# Patient Record
Sex: FEMALE | Race: WHITE | Employment: OTHER | URBAN - METROPOLITAN AREA
[De-identification: names, ages, dates, MRNs, and addresses within clinical notes are randomized per-mention and may not be internally consistent; named-entity substitution may affect disease eponyms.]

---

## 2018-12-22 ENCOUNTER — APPOINTMENT (OUTPATIENT)
Dept: CT IMAGING | Facility: HOSPITAL | Age: 83
End: 2018-12-22
Attending: EMERGENCY MEDICINE
Payer: MEDICARE

## 2018-12-22 ENCOUNTER — HOSPITAL ENCOUNTER (EMERGENCY)
Facility: HOSPITAL | Age: 83
Discharge: HOME | End: 2018-12-22
Attending: EMERGENCY MEDICINE
Payer: MEDICARE

## 2018-12-22 VITALS
DIASTOLIC BLOOD PRESSURE: 76 MMHG | HEART RATE: 91 BPM | TEMPERATURE: 99 F | SYSTOLIC BLOOD PRESSURE: 105 MMHG | OXYGEN SATURATION: 98 % | WEIGHT: 103 LBS | RESPIRATION RATE: 16 BRPM

## 2018-12-22 DIAGNOSIS — S81.811A LEG LACERATION, RIGHT, INITIAL ENCOUNTER: Primary | ICD-10-CM

## 2018-12-22 PROCEDURE — 36415 COLL VENOUS BLD VENIPUNCTURE: CPT

## 2018-12-22 PROCEDURE — 12004 RPR S/N/AX/GEN/TRK7.6-12.5CM: CPT

## 2018-12-22 PROCEDURE — 70450 CT HEAD/BRAIN W/O DYE: CPT | Performed by: EMERGENCY MEDICINE

## 2018-12-22 PROCEDURE — 99284 EMERGENCY DEPT VISIT MOD MDM: CPT

## 2018-12-22 PROCEDURE — 72125 CT NECK SPINE W/O DYE: CPT | Performed by: EMERGENCY MEDICINE

## 2018-12-22 PROCEDURE — 85610 PROTHROMBIN TIME: CPT | Performed by: EMERGENCY MEDICINE

## 2018-12-22 NOTE — ED INITIAL ASSESSMENT (HPI)
Patient trip and fell while getting out of bed. Patient on coumadin. Laceration on right leg. Patient did not hit head or loc.

## 2018-12-22 NOTE — ED PROVIDER NOTES
Patient Seen in: [facility] AND [facility] Emergency Department    History   Patient presents with:  Fall (musculoskeletal, neurologic)    Stated Complaint: fall and laceration     HPI    79 yo female tripped while walking with her cane. She fell.  She sustained There is no rebound and no guarding. Musculoskeletal: Normal range of motion. She exhibits no edema or tenderness. There is no focal bony tenderness. See note for skin laceration   Lymphadenopathy:     She has no cervical adenopathy.    Neurological: Sh

## 2019-08-25 ENCOUNTER — APPOINTMENT (OUTPATIENT)
Dept: GENERAL RADIOLOGY | Facility: HOSPITAL | Age: 84
End: 2019-08-25
Attending: EMERGENCY MEDICINE
Payer: MEDICARE

## 2019-08-25 ENCOUNTER — HOSPITAL ENCOUNTER (EMERGENCY)
Facility: HOSPITAL | Age: 84
Discharge: HOME | End: 2019-08-25
Attending: EMERGENCY MEDICINE
Payer: MEDICARE

## 2019-08-25 VITALS
BODY MASS INDEX: 19.15 KG/M2 | HEART RATE: 99 BPM | WEIGHT: 95 LBS | TEMPERATURE: 98 F | SYSTOLIC BLOOD PRESSURE: 117 MMHG | OXYGEN SATURATION: 100 % | HEIGHT: 59 IN | RESPIRATION RATE: 19 BRPM | DIASTOLIC BLOOD PRESSURE: 69 MMHG

## 2019-08-25 DIAGNOSIS — I50.23 ACUTE ON CHRONIC SYSTOLIC CONGESTIVE HEART FAILURE (HCC): ICD-10-CM

## 2019-08-25 DIAGNOSIS — J44.1 COPD EXACERBATION (HCC): Primary | ICD-10-CM

## 2019-08-25 LAB
ANION GAP SERPL CALC-SCNC: 5 MMOL/L (ref 0–18)
BASOPHILS # BLD AUTO: 0.02 X10(3) UL (ref 0–0.2)
BASOPHILS NFR BLD AUTO: 0.3 %
BILIRUB UR QL: NEGATIVE
BUN BLD-MCNC: 17 MG/DL (ref 7–18)
BUN/CREAT SERPL: 27 (ref 10–20)
CALCIUM BLD-MCNC: 9.1 MG/DL (ref 8.5–10.1)
CHLORIDE SERPL-SCNC: 104 MMOL/L (ref 98–112)
CLARITY UR: CLEAR
CO2 SERPL-SCNC: 35 MMOL/L (ref 21–32)
COLOR UR: YELLOW
CREAT BLD-MCNC: 0.63 MG/DL (ref 0.55–1.02)
DEPRECATED RDW RBC AUTO: 59.9 FL (ref 35.1–46.3)
EOSINOPHIL # BLD AUTO: 0.1 X10(3) UL (ref 0–0.7)
EOSINOPHIL NFR BLD AUTO: 1.7 %
ERYTHROCYTE [DISTWIDTH] IN BLOOD BY AUTOMATED COUNT: 17.1 % (ref 11–15)
GLUCOSE BLD-MCNC: 94 MG/DL (ref 70–99)
GLUCOSE UR-MCNC: NEGATIVE MG/DL
HCT VFR BLD AUTO: 37.4 % (ref 35–48)
HGB BLD-MCNC: 11.5 G/DL (ref 12–16)
HGB UR QL STRIP.AUTO: NEGATIVE
IMM GRANULOCYTES # BLD AUTO: 0.02 X10(3) UL (ref 0–1)
IMM GRANULOCYTES NFR BLD: 0.3 %
KETONES UR-MCNC: NEGATIVE MG/DL
LEUKOCYTE ESTERASE UR QL STRIP.AUTO: NEGATIVE
LYMPHOCYTES # BLD AUTO: 0.74 X10(3) UL (ref 1–4)
LYMPHOCYTES NFR BLD AUTO: 12.6 %
MCH RBC QN AUTO: 29.6 PG (ref 26–34)
MCHC RBC AUTO-ENTMCNC: 30.7 G/DL (ref 31–37)
MCV RBC AUTO: 96.4 FL (ref 80–100)
MONOCYTES # BLD AUTO: 0.78 X10(3) UL (ref 0.1–1)
MONOCYTES NFR BLD AUTO: 13.3 %
NEUTROPHILS # BLD AUTO: 4.19 X10 (3) UL (ref 1.5–7.7)
NEUTROPHILS # BLD AUTO: 4.19 X10(3) UL (ref 1.5–7.7)
NEUTROPHILS NFR BLD AUTO: 71.8 %
NITRITE UR QL STRIP.AUTO: NEGATIVE
NT-PROBNP SERPL-MCNC: 2942 PG/ML (ref ?–450)
OSMOLALITY SERPL CALC.SUM OF ELEC: 299 MOSM/KG (ref 275–295)
PH UR: 5 [PH] (ref 5–8)
PLATELET # BLD AUTO: 247 10(3)UL (ref 150–450)
POTASSIUM SERPL-SCNC: 4.2 MMOL/L (ref 3.5–5.1)
PROT UR-MCNC: 30 MG/DL
RBC # BLD AUTO: 3.88 X10(6)UL (ref 3.8–5.3)
RBC #/AREA URNS AUTO: 2 /HPF
SODIUM SERPL-SCNC: 144 MMOL/L (ref 136–145)
SP GR UR STRIP: 1.02 (ref 1–1.03)
TROPONIN I SERPL-MCNC: <0.045 NG/ML (ref ?–0.04)
UROBILINOGEN UR STRIP-ACNC: 2
VIT C UR-MCNC: NEGATIVE MG/DL
WBC # BLD AUTO: 5.9 X10(3) UL (ref 4–11)
WBC #/AREA URNS AUTO: 1 /HPF

## 2019-08-25 PROCEDURE — 99285 EMERGENCY DEPT VISIT HI MDM: CPT

## 2019-08-25 PROCEDURE — 83880 ASSAY OF NATRIURETIC PEPTIDE: CPT | Performed by: EMERGENCY MEDICINE

## 2019-08-25 PROCEDURE — 85025 COMPLETE CBC W/AUTO DIFF WBC: CPT | Performed by: EMERGENCY MEDICINE

## 2019-08-25 PROCEDURE — 96374 THER/PROPH/DIAG INJ IV PUSH: CPT

## 2019-08-25 PROCEDURE — 80048 BASIC METABOLIC PNL TOTAL CA: CPT | Performed by: EMERGENCY MEDICINE

## 2019-08-25 PROCEDURE — 84484 ASSAY OF TROPONIN QUANT: CPT | Performed by: EMERGENCY MEDICINE

## 2019-08-25 PROCEDURE — 71045 X-RAY EXAM CHEST 1 VIEW: CPT | Performed by: EMERGENCY MEDICINE

## 2019-08-25 PROCEDURE — 93005 ELECTROCARDIOGRAM TRACING: CPT

## 2019-08-25 PROCEDURE — 93010 ELECTROCARDIOGRAM REPORT: CPT | Performed by: EMERGENCY MEDICINE

## 2019-08-25 PROCEDURE — 94640 AIRWAY INHALATION TREATMENT: CPT

## 2019-08-25 PROCEDURE — 81001 URINALYSIS AUTO W/SCOPE: CPT | Performed by: EMERGENCY MEDICINE

## 2019-08-25 RX ORDER — TORSEMIDE 5 MG/1
10 TABLET ORAL 2 TIMES DAILY
COMMUNITY

## 2019-08-25 RX ORDER — TRAMADOL HYDROCHLORIDE 50 MG/1
50 TABLET ORAL 2 TIMES DAILY
Status: ON HOLD | COMMUNITY
End: 2020-11-18 | Stop reason: ALTCHOICE

## 2019-08-25 RX ORDER — METHYLPREDNISOLONE SODIUM SUCCINATE 125 MG/2ML
125 INJECTION, POWDER, LYOPHILIZED, FOR SOLUTION INTRAMUSCULAR; INTRAVENOUS ONCE
Status: COMPLETED | OUTPATIENT
Start: 2019-08-25 | End: 2019-08-25

## 2019-08-25 RX ORDER — LEVALBUTEROL TARTRATE 45 UG/1
1 AEROSOL, METERED ORAL EVERY 4 HOURS PRN
Status: ON HOLD | COMMUNITY
End: 2020-11-18 | Stop reason: ALTCHOICE

## 2019-08-25 RX ORDER — LEVALBUTEROL INHALATION SOLUTION 0.63 MG/3ML
SOLUTION RESPIRATORY (INHALATION) EVERY 4 HOURS PRN
Status: ON HOLD | COMMUNITY
End: 2020-11-18 | Stop reason: ALTCHOICE

## 2019-08-25 RX ORDER — PREDNISONE 20 MG/1
40 TABLET ORAL DAILY
Qty: 8 TABLET | Refills: 0 | Status: SHIPPED | OUTPATIENT
Start: 2019-08-25 | End: 2019-08-29

## 2019-08-25 RX ORDER — WARFARIN SODIUM 3 MG/1
3 TABLET ORAL DAILY
Status: ON HOLD | COMMUNITY
End: 2020-12-01

## 2019-08-25 RX ORDER — POTASSIUM CHLORIDE 750 MG/1
10 TABLET, EXTENDED RELEASE ORAL DAILY
COMMUNITY

## 2019-08-25 RX ORDER — FUROSEMIDE 20 MG/1
20 TABLET ORAL DAILY
Status: DISCONTINUED | OUTPATIENT
Start: 2019-08-25 | End: 2019-08-25

## 2019-08-25 RX ORDER — NEBIVOLOL 2.5 MG/1
2.5 TABLET ORAL DAILY
Status: ON HOLD | COMMUNITY
End: 2020-11-18

## 2019-08-25 RX ORDER — IPRATROPIUM BROMIDE AND ALBUTEROL SULFATE 2.5; .5 MG/3ML; MG/3ML
3 SOLUTION RESPIRATORY (INHALATION) ONCE
Status: COMPLETED | OUTPATIENT
Start: 2019-08-25 | End: 2019-08-25

## 2019-08-25 NOTE — ED PROVIDER NOTES
Patient Seen in: [facility] AND [facility] Emergency Department    History   Patient presents with:  Dyspnea RAYNA SOB (respiratory)    Stated Complaint: Shortness of breath.     HPI    The patient is an 80-year-old female with a history of atrial fibrillation, CHF normal.   Neck: Normal range of motion. Neck supple. JVD present. Cardiovascular: Normal rate, regular rhythm, normal heart sounds and intact distal pulses. No murmur heard. Pulmonary/Chest: Effort normal. Tachypnea (Mild) noted.  No respiratory distre Abnormality         Status                     ---------                               -----------         ------                     CBC W/ DIFFERENTIAL[[id number]]          Abnormal            Final result                 Please view results for t provider within the next three months to obtain basic health screening including reassessment of your blood pressure.     Medications Prescribed:  Current Discharge Medication List    START taking these medications    predniSONE 20 MG Oral Tab  Take 2 table

## 2019-08-25 NOTE — ED NOTES
Patient given discharge instructions and prescriptions sent to pharmacy. Patient verbalized understanding. Wheeled out of ED by tech.

## 2019-12-02 ENCOUNTER — APPOINTMENT (OUTPATIENT)
Dept: GENERAL RADIOLOGY | Facility: HOSPITAL | Age: 84
End: 2019-12-02
Attending: EMERGENCY MEDICINE
Payer: MEDICARE

## 2019-12-02 ENCOUNTER — HOSPITAL ENCOUNTER (EMERGENCY)
Facility: HOSPITAL | Age: 84
Discharge: HOME | End: 2019-12-02
Attending: EMERGENCY MEDICINE
Payer: MEDICARE

## 2019-12-02 VITALS
SYSTOLIC BLOOD PRESSURE: 114 MMHG | WEIGHT: 96 LBS | HEART RATE: 103 BPM | RESPIRATION RATE: 18 BRPM | BODY MASS INDEX: 19 KG/M2 | OXYGEN SATURATION: 100 % | TEMPERATURE: 98 F | DIASTOLIC BLOOD PRESSURE: 76 MMHG

## 2019-12-02 DIAGNOSIS — K59.00 CONSTIPATION, UNSPECIFIED CONSTIPATION TYPE: Primary | ICD-10-CM

## 2019-12-02 DIAGNOSIS — R35.0 URINARY FREQUENCY: ICD-10-CM

## 2019-12-02 PROCEDURE — 74018 RADEX ABDOMEN 1 VIEW: CPT | Performed by: EMERGENCY MEDICINE

## 2019-12-02 PROCEDURE — 85025 COMPLETE CBC W/AUTO DIFF WBC: CPT | Performed by: EMERGENCY MEDICINE

## 2019-12-02 PROCEDURE — 36415 COLL VENOUS BLD VENIPUNCTURE: CPT

## 2019-12-02 PROCEDURE — 80048 BASIC METABOLIC PNL TOTAL CA: CPT | Performed by: EMERGENCY MEDICINE

## 2019-12-02 PROCEDURE — 85610 PROTHROMBIN TIME: CPT | Performed by: EMERGENCY MEDICINE

## 2019-12-02 PROCEDURE — 81001 URINALYSIS AUTO W/SCOPE: CPT | Performed by: EMERGENCY MEDICINE

## 2019-12-02 PROCEDURE — 99284 EMERGENCY DEPT VISIT MOD MDM: CPT

## 2019-12-02 NOTE — ED NOTES
PATIENT RECEIVED C/O BEING UP ALL NIGHT WITH DYSURIA, URINATING FREQUENT, PLACED PATIENT ON 2 L NC FOR HX OF COPD NO FEVERS, NO ADDITIONAL COMPLAINTS

## 2019-12-02 NOTE — ED INITIAL ASSESSMENT (HPI)
Sam Koyanagi arrives via EMS from home--she reports frequent urination, about every 30 min to 1 hour overnight.  Denies any pain or nausea

## 2019-12-02 NOTE — ED PROVIDER NOTES
Patient Seen in: [facility] AND [facility] Emergency Department      History   Patient presents with:  Urinary Symptoms (urologic)    Stated Complaint: frequent urination    HPI    Patient is an 66-year-old female that states that she has had frequent urination regular rhythm, normal heart sounds and intact distal pulses. Pulmonary/Chest: Effort normal and breath sounds normal. No respiratory distress. Abdominal: Soft. Bowel sounds are normal. Exhibits no distension and no mass. There is no tenderness.  There these tests on the individual orders. RAINBOW DRAW BLUE   RAINBOW DRAW LAVENDER   RAINBOW DRAW LIGHT GREEN   RAINBOW DRAW GOLD          Urinalysis and bladder scan noted. Will check x-ray.         MDM     Xr Abdomen (1 View) (cpt=74018)    Result Date: 1

## 2020-01-17 ENCOUNTER — HOSPITAL ENCOUNTER (EMERGENCY)
Facility: HOSPITAL | Age: 85
Discharge: HOME | End: 2020-01-17
Attending: EMERGENCY MEDICINE
Payer: MEDICARE

## 2020-01-17 ENCOUNTER — APPOINTMENT (OUTPATIENT)
Dept: CT IMAGING | Facility: HOSPITAL | Age: 85
End: 2020-01-17
Attending: EMERGENCY MEDICINE
Payer: MEDICARE

## 2020-01-17 VITALS
WEIGHT: 95.88 LBS | BODY MASS INDEX: 17.64 KG/M2 | DIASTOLIC BLOOD PRESSURE: 70 MMHG | TEMPERATURE: 99 F | HEIGHT: 62 IN | OXYGEN SATURATION: 94 % | HEART RATE: 88 BPM | SYSTOLIC BLOOD PRESSURE: 124 MMHG | RESPIRATION RATE: 18 BRPM
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
## 2020-01-17 LAB
ANION GAP SERPL CALC-SCNC: 4 MMOL/L (ref 0–18)
BASOPHILS # BLD AUTO: 0.03 X10(3) UL (ref 0–0.2)
BASOPHILS NFR BLD AUTO: 0.4 %
BUN BLD-MCNC: 34 MG/DL (ref 7–18)
BUN/CREAT SERPL: 50 (ref 10–20)
CALCIUM BLD-MCNC: 9.4 MG/DL (ref 8.5–10.1)
CHLORIDE SERPL-SCNC: 101 MMOL/L (ref 98–112)
CO2 SERPL-SCNC: 37 MMOL/L (ref 21–32)
CREAT BLD-MCNC: 0.68 MG/DL (ref 0.55–1.02)
DEPRECATED RDW RBC AUTO: 56.5 FL (ref 35.1–46.3)
EOSINOPHIL # BLD AUTO: 0.1 X10(3) UL (ref 0–0.7)
EOSINOPHIL NFR BLD AUTO: 1.4 %
ERYTHROCYTE [DISTWIDTH] IN BLOOD BY AUTOMATED COUNT: 17.2 % (ref 11–15)
GLUCOSE BLD-MCNC: 99 MG/DL (ref 70–99)
HCT VFR BLD AUTO: 40.3 % (ref 35–48)
HGB BLD-MCNC: 12.5 G/DL (ref 12–16)
IMM GRANULOCYTES # BLD AUTO: 0.02 X10(3) UL (ref 0–1)
IMM GRANULOCYTES NFR BLD: 0.3 %
LYMPHOCYTES # BLD AUTO: 1.2 X10(3) UL (ref 1–4)
LYMPHOCYTES NFR BLD AUTO: 17.2 %
MCH RBC QN AUTO: 28.8 PG (ref 26–34)
MCHC RBC AUTO-ENTMCNC: 31 G/DL (ref 31–37)
MCV RBC AUTO: 92.9 FL (ref 80–100)
MONOCYTES # BLD AUTO: 0.84 X10(3) UL (ref 0.1–1)
MONOCYTES NFR BLD AUTO: 12 %
NEUTROPHILS # BLD AUTO: 4.79 X10 (3) UL (ref 1.5–7.7)
NEUTROPHILS # BLD AUTO: 4.79 X10(3) UL (ref 1.5–7.7)
NEUTROPHILS NFR BLD AUTO: 68.7 %
OSMOLALITY SERPL CALC.SUM OF ELEC: 302 MOSM/KG (ref 275–295)
PLATELET # BLD AUTO: 196 10(3)UL (ref 150–450)
POTASSIUM SERPL-SCNC: 4.4 MMOL/L (ref 3.5–5.1)
RBC # BLD AUTO: 4.34 X10(6)UL (ref 3.8–5.3)
SODIUM SERPL-SCNC: 142 MMOL/L (ref 136–145)
WBC # BLD AUTO: 7 X10(3) UL (ref 4–11)

## 2020-01-17 PROCEDURE — 93005 ELECTROCARDIOGRAM TRACING: CPT

## 2020-01-17 PROCEDURE — 99284 EMERGENCY DEPT VISIT MOD MDM: CPT

## 2020-01-17 PROCEDURE — 36415 COLL VENOUS BLD VENIPUNCTURE: CPT

## 2020-01-17 PROCEDURE — 80048 BASIC METABOLIC PNL TOTAL CA: CPT | Performed by: EMERGENCY MEDICINE

## 2020-01-17 PROCEDURE — 93010 ELECTROCARDIOGRAM REPORT: CPT | Performed by: EMERGENCY MEDICINE

## 2020-01-17 PROCEDURE — 72125 CT NECK SPINE W/O DYE: CPT | Performed by: EMERGENCY MEDICINE

## 2020-01-17 PROCEDURE — 85025 COMPLETE CBC W/AUTO DIFF WBC: CPT | Performed by: EMERGENCY MEDICINE

## 2020-01-17 PROCEDURE — 70450 CT HEAD/BRAIN W/O DYE: CPT | Performed by: EMERGENCY MEDICINE

## 2020-01-18 NOTE — ED INITIAL ASSESSMENT (HPI)
Radha Hunter fell out of a chair and bumped the back of her head. No LOC. Closed and raised contusion to the back of her head. On Coumadin.

## 2020-01-18 NOTE — ED PROVIDER NOTES
Patient Seen in: [facility] AND [facility] Emergency Department      History   Patient presents with:  Fall    Stated Complaint: fall on counadin    HPI    66-year-old female with atrial fibrillation on Coumadin, CHF, COPD, hypertension presenting for evaluation present to the right of the occiput  Neck:      Musculoskeletal: Normal range of motion. Cardiovascular:      Rate and Rhythm: Normal rate and regular rhythm. Heart sounds: Normal heart sounds.       Comments: Bilateral radial, carotids, DP pulses ar noted on EKG Report. Rate: 78  Rhythm: Atrial Fibrillation  Reading: no STEMI              Ct Brain Or Head ([id number])    Result Date: 1/17/2020  CONCLUSION:  1. No intracranial hemorrhage, skull fracture or large subcutaneous hematoma.   Small bilateral  diagnosis)    Disposition:  Discharge  1/17/2020  9:37 pm    Follow-up:  Dulce Monson, [address] [facility]  [phone]    In 3 days      We recommend that you schedule follow up care with a primary care provider within the next three mo

## 2020-11-18 ENCOUNTER — APPOINTMENT (OUTPATIENT)
Dept: CT IMAGING | Facility: HOSPITAL | Age: 85
DRG: 947 | End: 2020-11-18
Attending: EMERGENCY MEDICINE
Payer: MEDICARE

## 2020-11-18 ENCOUNTER — HOSPITAL ENCOUNTER (INPATIENT)
Facility: HOSPITAL | Age: 85
LOS: 14 days | Discharge: HOME HEALTH | DRG: 947 | End: 2020-12-02
Attending: EMERGENCY MEDICINE | Admitting: HOSPITALIST
Payer: MEDICARE

## 2020-11-18 ENCOUNTER — APPOINTMENT (OUTPATIENT)
Dept: GENERAL RADIOLOGY | Facility: HOSPITAL | Age: 85
DRG: 947 | End: 2020-11-18
Attending: EMERGENCY MEDICINE
Payer: MEDICARE
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
## 2020-11-18 DIAGNOSIS — R79.1 SUPRATHERAPEUTIC INR: Primary | ICD-10-CM

## 2020-11-18 PROCEDURE — 72100 X-RAY EXAM L-S SPINE 2/3 VWS: CPT | Performed by: EMERGENCY MEDICINE

## 2020-11-18 PROCEDURE — 99223 1ST HOSP IP/OBS HIGH 75: CPT | Performed by: HOSPITALIST

## 2020-11-18 PROCEDURE — 72125 CT NECK SPINE W/O DYE: CPT | Performed by: EMERGENCY MEDICINE

## 2020-11-18 PROCEDURE — 70450 CT HEAD/BRAIN W/O DYE: CPT | Performed by: EMERGENCY MEDICINE

## 2020-11-18 RX ORDER — METOPROLOL SUCCINATE 25 MG/1
25 TABLET, EXTENDED RELEASE ORAL DAILY
Status: ON HOLD | COMMUNITY
End: 2020-12-02

## 2020-11-18 RX ORDER — ACETAMINOPHEN 160 MG/5ML
1000 SOLUTION ORAL ONCE
Status: COMPLETED | OUTPATIENT
Start: 2020-11-18 | End: 2020-11-18

## 2020-11-18 RX ORDER — ACETAMINOPHEN 500 MG
1000 TABLET ORAL ONCE
Status: DISCONTINUED | OUTPATIENT
Start: 2020-11-18 | End: 2020-12-02

## 2020-11-18 RX ORDER — DILTIAZEM HYDROCHLORIDE 120 MG/1
120 CAPSULE, EXTENDED RELEASE ORAL DAILY
Status: DISCONTINUED | OUTPATIENT
Start: 2020-11-18 | End: 2020-11-26

## 2020-11-18 RX ORDER — POLYETHYLENE GLYCOL 3350 17 G/17G
17 POWDER, FOR SOLUTION ORAL DAILY PRN
Status: DISCONTINUED | OUTPATIENT
Start: 2020-11-18 | End: 2020-12-02

## 2020-11-18 RX ORDER — LEVALBUTEROL TARTRATE 45 UG/1
2 AEROSOL, METERED ORAL EVERY 4 HOURS PRN
COMMUNITY

## 2020-11-18 RX ORDER — ALBUTEROL SULFATE 90 UG/1
2 AEROSOL, METERED RESPIRATORY (INHALATION) DAILY
Status: DISCONTINUED | OUTPATIENT
Start: 2020-11-19 | End: 2020-12-02

## 2020-11-18 RX ORDER — HYDROXYZINE HYDROCHLORIDE 25 MG/1
25 TABLET, FILM COATED ORAL NIGHTLY
Status: ON HOLD | COMMUNITY
End: 2020-11-18 | Stop reason: ALTCHOICE

## 2020-11-18 RX ORDER — ONDANSETRON 2 MG/ML
4 INJECTION INTRAMUSCULAR; INTRAVENOUS EVERY 6 HOURS PRN
Status: DISCONTINUED | OUTPATIENT
Start: 2020-11-18 | End: 2020-12-02

## 2020-11-18 RX ORDER — SODIUM PHOSPHATE, DIBASIC AND SODIUM PHOSPHATE, MONOBASIC 7; 19 G/133ML; G/133ML
1 ENEMA RECTAL ONCE AS NEEDED
Status: DISCONTINUED | OUTPATIENT
Start: 2020-11-18 | End: 2020-12-02

## 2020-11-18 RX ORDER — METOPROLOL SUCCINATE 25 MG/1
25 TABLET, EXTENDED RELEASE ORAL DAILY
Status: DISCONTINUED | OUTPATIENT
Start: 2020-11-18 | End: 2020-11-24

## 2020-11-18 RX ORDER — ALBUTEROL SULFATE 90 UG/1
2 AEROSOL, METERED RESPIRATORY (INHALATION) DAILY
COMMUNITY

## 2020-11-18 RX ORDER — BISACODYL 10 MG
10 SUPPOSITORY, RECTAL RECTAL
Status: DISCONTINUED | OUTPATIENT
Start: 2020-11-18 | End: 2020-12-02

## 2020-11-18 RX ORDER — SODIUM CHLORIDE 0.9 % (FLUSH) 0.9 %
3 SYRINGE (ML) INJECTION AS NEEDED
Status: DISCONTINUED | OUTPATIENT
Start: 2020-11-18 | End: 2020-12-02

## 2020-11-18 RX ORDER — DILTIAZEM HYDROCHLORIDE 120 MG/1
120 CAPSULE, EXTENDED RELEASE ORAL DAILY
Status: ON HOLD | COMMUNITY
End: 2020-12-02

## 2020-11-18 NOTE — ED PROVIDER NOTES
Patient Seen in: [facility] AND [facility] Emergency Department      History   Patient presents with:  Fall    Stated Complaint:     HPI    42-year-old female with history of atrial fibrillation, on Coumadin, CHF, COPD, hypertension here with complaints of mecha Device Nasal cannula       Current:/63   Pulse 77   Temp 97.3 °F (36.3 °C) (Temporal)   Resp 16   Ht 162.6 cm (5' 4\")   Wt 40.4 kg   SpO2 100%   BMI 15.28 kg/m²         Physical Exam  Vitals signs and nursing note reviewed.    HENT:      Head: Normoc retrolisthesis. Lumbar vertebral body heights are maintained without compression fracture. Moderate degenerative disc disease from the lower thoracic spine through L4/5. Relative preservation of L5/S1 disc space.   Bilateral facet joint osteoarthritis pr discussed with Dr Gildardo Blevins at 4259 EST. If there are any questions please feel free to contact me directly at [phone], ext [phone]. If you cannot reach me at this number, do not leave a voicemail.  Please call [phone] ext [phone] and ask for the next availabl with a primary care provider within the next three months to obtain basic health screening including reassessment of your blood pressure.       Medications Prescribed:  Current Discharge Medication List                          Present on Admission

## 2020-11-18 NOTE — H&P
[address] [facility] Patient Status:  Inpatient    1930 MRN [id number]   Location [facility] 3W/SW Attending Geraldine Mayfield MD   Hosp Day # 0 PCP Antione Greer     Date:  2020  D Inhalation Aerosol, Inhale 1 puff into the lungs every 4 (four) hours as needed for Wheezing. •  Ipratropium Bromide 0.02 % Inhalation Solution, Take 500 mcg by nebulization daily.     •  Levalbuterol HCl 0.63 MG/3ML Inhalation Nebu Soln, Take by [employer] symmetrical, trachea midline and thyroid not enlarged, symmetric, no tenderness/mass/nodules  Back: symmetric, no curvature. ROM normal. No CVA tenderness.   Pulmonary:  clear to auscultation bilaterally  Cardiovascular: S1, S2 normal, no murmur, click, rub and there are no significant discrepancies.    Dictated by (CST): Dayne Aschoff, MD on 11/18/2020 at 8:42 AM     Finalized by (CST): Dayne Aschoff, MD on 11/18/2020 at 8:47 AM          Xr Lumbar Spine (min 2 Views) (cpt=72100)    Result Date: 11/18/2020  CONCLU

## 2020-11-18 NOTE — ED NOTES
Orders for admission, patient is aware of plan and ready to go upstairs. Any questions, please call ED MICHAEL Alston   at extension [phone].    Type of COVID test sent:Rapid  COVID Suspicion level: Low    Titratable drug(s) infusing:  Rate:    LOC at time of stern

## 2020-11-18 NOTE — ED INITIAL ASSESSMENT (HPI)
Patient arrives via ems s/p mechanical fall states tripped while trying to pick something up patient takes coumadin A&OX4 no open wounds hematoma to posterior head and c/o upper back pain moving all extremities

## 2020-11-18 NOTE — CM/SW NOTE
SW self referred pt for d/c planning. SW met w/ pt in her room. Pt verified her address and confirmed living w/ her dtr, Ta Brito. They live in a single level home w/ a basement. Pt does not use the basement.  Pt reports having approx 3 steps to enter the h

## 2020-11-19 ENCOUNTER — APPOINTMENT (OUTPATIENT)
Dept: CT IMAGING | Facility: HOSPITAL | Age: 85
DRG: 947 | End: 2020-11-19
Attending: HOSPITALIST
Payer: MEDICARE

## 2020-11-19 ENCOUNTER — APPOINTMENT (OUTPATIENT)
Dept: GENERAL RADIOLOGY | Facility: HOSPITAL | Age: 85
DRG: 947 | End: 2020-11-19
Attending: HOSPITALIST
Payer: MEDICARE

## 2020-11-19 PROCEDURE — 70450 CT HEAD/BRAIN W/O DYE: CPT | Performed by: HOSPITALIST

## 2020-11-19 PROCEDURE — 71045 X-RAY EXAM CHEST 1 VIEW: CPT | Performed by: HOSPITALIST

## 2020-11-19 PROCEDURE — 99233 SBSQ HOSP IP/OBS HIGH 50: CPT | Performed by: HOSPITALIST

## 2020-11-19 PROCEDURE — 99221 1ST HOSP IP/OBS SF/LOW 40: CPT | Performed by: INTERNAL MEDICINE

## 2020-11-19 RX ORDER — IPRATROPIUM BROMIDE AND ALBUTEROL SULFATE 2.5; .5 MG/3ML; MG/3ML
3 SOLUTION RESPIRATORY (INHALATION) EVERY 6 HOURS PRN
Status: DISCONTINUED | OUTPATIENT
Start: 2020-11-19 | End: 2020-12-02

## 2020-11-19 RX ORDER — MULTIPLE VITAMINS W/ MINERALS TAB 9MG-400MCG
1 TAB ORAL DAILY
Status: DISCONTINUED | OUTPATIENT
Start: 2020-11-19 | End: 2020-12-02

## 2020-11-19 RX ORDER — QUETIAPINE 25 MG/1
25 TABLET, FILM COATED ORAL ONCE
Status: DISCONTINUED | OUTPATIENT
Start: 2020-11-19 | End: 2020-11-20

## 2020-11-19 NOTE — PLAN OF CARE
Problem: Patient Centered Care  Goal: Patient preferences are identified and integrated in the patient's plan of care  Description: Interventions:  - What would you like us to know as we care for you?  Patient lives with daughter  - Provide timely, comple signs and symptoms of electrolyte imbalances  - Administer electrolyte replacement as ordered  - Monitor response to electrolyte replacements, including rhythm and repeat lab results as appropriate  - Fluid restriction as ordered  - Instruct patient on flu pressure  - Maintain blood pressure and fluid volume within ordered parameters to optimize cerebral perfusion and minimize risk of hemorrhage  - Monitor temperature, glucose, and sodium.  Initiate appropriate interventions as ordered  Outcome: Progressing

## 2020-11-19 NOTE — PROGRESS NOTES
[facility]    Progress Note    Gilmer Carlos Patient Status:  Inpatient    1930 MRN [id number]   Location [facility] 3W/SW Attending Yesi Lopez, [address] [facility] Day # 1 PCP JONEL Gao       Subjective:   Mani Fitzpatrick K 4.8 11/19/2020    CL 93 (L) 11/19/2020    CO2 45.0 (HH) 11/19/2020     (H) 11/19/2020    CA 8.9 11/19/2020    ALB 2.7 (L) 05/06/2019    ALKPHO 70 05/06/2019    BILT 0.4 05/06/2019    TP 5.7 (L) 05/06/2019    AST 18 05/06/2019    ALT 15 05/06/2019 aorta.     Dictated by (CST): Agustin Akhtar MD on 11/19/2020 at 10:30 AM     Finalized by (CST): Agustin Akhtar MD on 11/19/2020 at 10:32 AM                Results:     CBC:    Lab Results   Component Value Date    WBC 5.5 11/18/2020    WBC 7.0 01/17/2020

## 2020-11-19 NOTE — PLAN OF CARE
Pt remains forgetful throughout the shift. Pt frequently calls out for help. Intermittently hallucinating. Pt desaturated on 2 L of oxygen in the morning, and stated that she couldn't breathe. Oxygen increased,  sats better, and pt felt more comfortable.  C devices  - Assist with transfers and ambulation using safe patient handling equipment as needed  - Ensure adequate protection for wounds/incisions during mobilization  - Obtain PT/OT consults as needed  - Advance activity as appropriate  - Communicate orde

## 2020-11-19 NOTE — CONSULTS
[facility]    Report of Consultation    Jt Aguilera Patient Status:  Inpatient    1930 MRN [id number]   Location [facility] 3W/SW Attending Diomedes Morfin, [address] Day # 1 PCP Rohit Ng     Date of Admission: 17 g, Oral, Daily PRN    •  magnesium hydroxide (MILK OF MAGNESIA) 400 MG/5ML suspension 30 mL, 30 mL, Oral, Daily PRN    •  bisacodyl (DULCOLAX) rectal suppository 10 mg, 10 mg, Rectal, Daily PRN    •  Fleet Enema (FLEET) 7-19 GM/118ML enema 133 mL, 1 shira temperature source Oral, resp. rate 22, height 5' 4\" (1.626 m), weight 84 lb 4.8 oz (38.2 kg), SpO2 93 %.     Constitutional: no acute distress  Eyes: PERRL  ENT: nares patent  Cardio: Irregularly irregular, S1 S2  Respiratory: Slightly diminished expirato 11/18/2020  CONCLUSION:   Degenerative disc and facet disease throughout the lumbar spine, most prominent at L3-L4. Infrarenal abdominal aortic aneurysm.   Preliminary report was submitted by the [facility] teleradiologist and there are no significant discrepa

## 2020-11-19 NOTE — PHYSICAL THERAPY NOTE
PHYSICAL THERAPY EVALUATION - INPATIENT     Room Number: OYU2/ZLD5-X  Evaluation Date: 11/19/2020  Type of Evaluation: Initial   Physician Order: PT Eval and Treat    Presenting Problem: supratherapeutic INR, recent fall  Reason for Therapy: Mobility Dysf conservation;Patient education; Family education;Gait training;Strengthening;Transfer training;Balance training;Stoop training  Rehab Potential : Good  Frequency (Obs): 3-5x/week       PHYSICAL THERAPY MEDICAL/SOCIAL HISTORY     History related to current a 4/5    Lower extremity ROM is within functional limits     Lower extremity strength is within functional limits 3/5    BALANCE  Static Sitting: Fair  Dynamic Sitting: Fair -  Static Standing: Fair -  Dynamic Standing: Poor +  AM-PAC '6-Clicks' INPATIENT SH Goal #2  Current Status Min A with RW   Goal #3 Patient is able to ambulate 50 feet with assist device: walker - rolling at assistance level: supervision   Goal #3   Current Status Min A with RW 12' decreased 02 sats 87% on 2 lts   Goal #4 Patient will n

## 2020-11-19 NOTE — DIETARY NOTE
ADULT NUTRITION INITIAL ASSESSMENT    Pt is at high nutrition risk. Pt meets severe malnutrition criteria.       CRITERIA FOR MALNUTRITION DIAGNOSIS:  Criteria for severe malnutrition diagnosis: chronic illness related to energy intake less than 75% for gr nutrition care: collaboration with other providers  - Discharge and transfer of nutrition care to new setting or provider: to be determined    ADMITTING DIAGNOSIS:   Supratherapeutic INR [R79.1]  Fall, initial encounter [W12. 23[address] Marva Rae Lisa Valerio FINDINGS:  - Body Fat/Muscle Mass: Unable to assess as pt out of room at time of visit. Per d/w RN, pt very cachectic.   - Fluid Accumulation: none per RN documentation  - Skin Integrity: RN documentation reviewed and stag

## 2020-11-19 NOTE — PROGRESS NOTES
S Pt was agitated and confused, wanted to go home. Was obsessively trying to de-tangle cords. O Pt being treated for a fall she took at home. A Engaged in active listening and non-anxious presence, prayer.     P No need for follow up unless consult e

## 2020-11-19 NOTE — PHYSICAL THERAPY NOTE
PT orders received, chart reviewed. PT spoke with bed side RN who reports pt's 02 sats have been fluctuating / desaturating at rest and recommended rest at this time. PT will re attempt in PM as pt tolerance and medical progress allows.

## 2020-11-20 PROBLEM — F05 DELIRIUM DUE TO ANOTHER MEDICAL CONDITION: Status: ACTIVE | Noted: 2020-11-20

## 2020-11-20 PROBLEM — F39 EPISODIC MOOD DISORDER (HCC): Status: ACTIVE | Noted: 2020-11-20

## 2020-11-20 PROCEDURE — 99233 SBSQ HOSP IP/OBS HIGH 50: CPT | Performed by: HOSPITALIST

## 2020-11-20 PROCEDURE — 99232 SBSQ HOSP IP/OBS MODERATE 35: CPT | Performed by: INTERNAL MEDICINE

## 2020-11-20 PROCEDURE — 90792 PSYCH DIAG EVAL W/MED SRVCS: CPT | Performed by: OTHER

## 2020-11-20 RX ORDER — OLANZAPINE 2.5 MG/1
2.5 TABLET ORAL NIGHTLY
Status: DISCONTINUED | OUTPATIENT
Start: 2020-11-20 | End: 2020-11-23

## 2020-11-20 RX ORDER — HALOPERIDOL 5 MG/ML
1 INJECTION INTRAMUSCULAR EVERY 4 HOURS PRN
Status: DISCONTINUED | OUTPATIENT
Start: 2020-11-20 | End: 2020-11-23

## 2020-11-20 RX ORDER — MIRTAZAPINE 7.5 MG/1
7.5 TABLET, FILM COATED ORAL NIGHTLY
Status: DISCONTINUED | OUTPATIENT
Start: 2020-11-20 | End: 2020-11-23

## 2020-11-20 NOTE — PLAN OF CARE
Problem: Patient Centered Care  Goal: Patient preferences are identified and integrated in the patient's plan of care  Description: Interventions:  - What would you like us to know as we care for you?  I live at home with my daughter  - Provide timely, co Manage/alleviate anxiety  - Monitor for signs/symptoms of CO2 retention  Outcome: Progressing     Problem: METABOLIC/FLUID AND ELECTROLYTES - ADULT  Goal: Electrolytes maintained within normal limits  Description: INTERVENTIONS:  - Monitor labs and rhythm limitations with patient/family  Outcome: Progressing     Problem: NEUROLOGICAL - ADULT  Goal: Achieves stable or improved neurological status  Description: INTERVENTIONS  - Assess for and report changes in neurological status  - Initiate measures to preve 90's. Vitals stable. PT recommending home with 24 hour supervision vs. BEE. Will continue to monitor.

## 2020-11-20 NOTE — CDS QUERY
How to answer this Query:  1.) Click \"Edit button\" on the toolbar.  2.) Type an \"X\" in the bracket for the diagnosis that applies. (You may also add additional clinical details as you feel necessary to substantiate your response).    3.) Finally click \ RICA FOR FURTHER DETAILS    If you have any questions, please contact Clinical :  Mik Lock RN at [phone]     Thank You!     THIS FORM IS A PERMANENT PART OF THE MEDICAL RECORD

## 2020-11-20 NOTE — PHYSICAL THERAPY NOTE
PHYSICAL THERAPY TREATMENT NOTE - INPATIENT     Room Number: VCC5/JYJ9-E       Presenting Problem: supratherapeutic INR, recent fall    Problem List  Principal Problem:    Supratherapeutic INR  Active Problems:    Fall, initial encounter      PHYSICAL THER '6-Clicks' INPATIENT SHORT FORM - BASIC MOBILITY  How much difficulty does the patient currently have. ..  -   Turning over in bed (including adjusting bedclothes, sheets and blankets)?: A Little   -   Sitting down on and standing up from a chair with arms Patient will negotiate 2 stairs/one curb w/ assistive device and supervision   Goal #4   Current Status NT   Goal #5 Patient to demonstrate independence with home activity/exercise instructions provided to patient in preparation for discharge.

## 2020-11-20 NOTE — PROGRESS NOTES
[facility]    Progress Note    Zack Goldberg Patient Status:  Inpatient    1930 MRN [id number]   Location [facility] 3W/SW Attending Ashu Ambriz, [address] [facility] Day # 2 PCP Ken Rashid       Subjective:   Taylor Her Daily    •  Metoprolol Succinate ER (Toprol XL) 24 hr tab 25 mg, 25 mg, Oral, Daily        Lab Results   Component Value Date    WBC 5.5 11/18/2020    HGB 10.8 (L) 11/18/2020    HCT 37.3 11/18/2020    .0 11/18/2020    CREATSERUM 0.60 11/19/2020    B 01/17/2020    WBC 5.6 12/02/2019     Lab Results   Component Value Date    HGB 10.8 (L) 11/18/2020    HGB 12.5 01/17/2020    HGB 12.1 12/02/2019      Lab Results   Component Value Date    .0 11/18/2020    .0 01/17/2020    .0 12/02/2019

## 2020-11-20 NOTE — CM/SW NOTE
Per conversation w/ pt's dtr, Inés Metzger on 11/19 - plan for CG through [facility]. ELOISE contacted Santiago Reddy at: [phone] and confirmed pt is current w/ their [facility] services. ELOISE faxed clinical information to Santiago Reddy at: [phone].      instr

## 2020-11-20 NOTE — PROGRESS NOTES
[facility]     Progress Note        Olena Rolette Patient Status:  Inpatient    1930 MRN [id number]   Location [facility] 3W/SW Attending Lexx Sadler, [address] [facility] Day # 2 PCP Jan Gamino       Subjective:   Patient Daily    •  Metoprolol Succinate ER (Toprol XL) 24 hr tab 25 mg, 25 mg, Oral, Daily        Continuous Infusions:     Physical Exam  Constitutional: Somnolent  Eyes: PERRL  ENT: nares pateint  Cardio: RRR, S1 S2  Respiratory: Slightly diminished expiratory noted although well compensated with no significant respiratory acidosis. -Currently on 3 L nasal cannula oxygen.   Maintain saturations above upper 80s  -Nebulizer treatments  -Chest x-ray with small left basilar pleural effusion seen in underlying emphys

## 2020-11-20 NOTE — BH PROGRESS NOTE
Behavioral Health Note:  REASON FOR ADMISSION:   Mechanical fall     REASON FOR CONSULT:   Psychiatry consultation for evaluation and advice d/t delirium     OBJECTIVE:  Geovany Rao is a 80year old female with PMH significant for afib, CHD, COPD and essential year old female who lives in [place] with her daughter, Ta Brito. She has a son as well. Connor Brigettemichelle Ortiz has no hx alcohol, cannabis or illicit substance use. She quit smoking cigarettes ~10 years ago.      MENTAL STATUS EXAM:  DONNELL, liaison gathered family by

## 2020-11-21 PROCEDURE — 99232 SBSQ HOSP IP/OBS MODERATE 35: CPT | Performed by: INTERNAL MEDICINE

## 2020-11-21 PROCEDURE — 99233 SBSQ HOSP IP/OBS HIGH 50: CPT | Performed by: HOSPITALIST

## 2020-11-21 PROCEDURE — 99223 1ST HOSP IP/OBS HIGH 75: CPT | Performed by: OTHER

## 2020-11-21 RX ORDER — DEXTROSE MONOHYDRATE 25 G/50ML
INJECTION, SOLUTION INTRAVENOUS
Status: DISPENSED
Start: 2020-11-21 | End: 2020-11-21

## 2020-11-21 RX ORDER — METOPROLOL TARTRATE 5 MG/5ML
5 INJECTION INTRAVENOUS EVERY 6 HOURS PRN
Status: DISCONTINUED | OUTPATIENT
Start: 2020-11-21 | End: 2020-11-21

## 2020-11-21 RX ORDER — METOPROLOL TARTRATE 5 MG/5ML
5 INJECTION INTRAVENOUS EVERY 6 HOURS PRN
Status: DISCONTINUED | OUTPATIENT
Start: 2020-11-21 | End: 2020-12-02

## 2020-11-21 RX ORDER — HYDROXYZINE HYDROCHLORIDE 25 MG/1
25 TABLET, FILM COATED ORAL NIGHTLY
Status: ON HOLD | COMMUNITY
End: 2020-12-02

## 2020-11-21 NOTE — CONSULTS
[facility]    Report of Consultation    Jerald Pillai Patient Status:  Inpatient    1930 MRN [id number]   Location [facility] 3W/SW Attending Anabell Grubbs, [address] [facility] Day # 3 PCP Sandy Shock     Date of Admission: Intravenous, Q4H PRN    •  mirtazapine (REMERON) tab 7.5 mg, 7.5 mg, Oral, Nightly    •  OLANZapine (ZYPREXA) tab 2.5 mg, 2.5 mg, Oral, Nightly    •  OLANZapine (ZYPREXA) 2.5238 mg in Sterile Water for Injection IM injection, 2.5238 mg, Intramuscular, Q8H OTHER (SEE COMMENTS)    Comment:Atrial fibrillation    Review of Systems:   As in HPI, the rest of the 14 system review was done and was negative    Physical Exam:      11/20/20  1950 11/20/20  1951 11/21/20  0409 11/21/20  0844   BP: (!) 88/48 (!) 23/49 mildly clumsy  Rapid alternating movements intact    Gait:  Leaning to one side    Results:     Laboratory Data:  Lab Results   Component Value Date    WBC 5.7 11/21/2020    HGB 9.5 (L) 11/21/2020    HCT 31.2 (L) 11/21/2020    .0 11/21/2020    CREAT [place]  11/21/2020

## 2020-11-21 NOTE — PROGRESS NOTES
Per Am labs, phlebotomy stated glucose was 57. Bedside glucose was obtained which read 134. MD was notified, no new orders at this time, will continue to monitor.

## 2020-11-21 NOTE — PLAN OF CARE
Problem: RESPIRATORY - ADULT  Goal: Achieves optimal ventilation and oxygenation  Description: INTERVENTIONS:  - Assess for changes in respiratory status  - Assess for changes in mentation and behavior  - Position to facilitate oxygenation and minimize r functional ability and stability  - Promote increasing activity/tolerance for mobility and gait  - Educate and engage patient/family in tolerated activity level and precautions  - Recommend use of sit-stand lift for transfers  - Recommend use of chair posi

## 2020-11-21 NOTE — CONSULTS
[facility]    Report of Consultation    Jhonatan Miranda Patient Status:  Inpatient    1930 MRN [id number]   Location [facility] 3W/SW Attending Mohamud Santoro, [address] [facility] Day # 2 PCP Ulises You     Date of Admission: comfortable posture her had tilted toward her left shoulder. Patient speaking with her eyes closed. Patient demonstrate some dysarthric speech reporting that she is alert and oriented and she knows the date and the condition.   Otherwise her speech was ha 2.5 mg, Oral, Nightly    •  OLANZapine (ZYPREXA) 2.5238 mg in Sterile Water for Injection IM injection, 2.5238 mg, Intramuscular, Q8H PRN    •  Ipratropium Bromide (ATROVENT) 0.02 % nebulizer solution 0.5 mg, 0.5 mg, Nebulization, Q4H WA (5 times daily) Systems:     As by Admitting/Attending    Results:     Laboratory Data:  Lab Results   Component Value Date    WBC 5.4 11/20/2020    HGB 10.1 (L) 11/20/2020    HCT 33.3 (L) 11/20/2020    .0 11/20/2020    CREATSERUM 0.61 11/20/2020    BUN 13 11/20/20 Non-emergent chest CT recommended to further evaluate. Preliminary report was submitted by the [facility] teleradiologist and there are no significant discrepancies.    Dictated by (CST): Mariza Parker MD on 11/18/2020 at 8:42 AM     Finalized by (CST): Jessica affect  The patient denied any auditory or visual hallucination. The patient denied any memory otherwise found forgetful. Language is reasonable. Thought to process is circumstantial.  Intellect is average.   Judgment and insight are limited at the patie Visit:  Requested Prescriptions      No prescriptions requested or ordered in this encounter           Karen Miller MD  11/20/2020

## 2020-11-21 NOTE — PROGRESS NOTES
[facility]    Progress Note    Essence Courts Patient Status:  Inpatient    1930 MRN [id number]   Location [facility] 3W/SW Attending Caleb Méndez, [address] [facility] Day # 3 PCP JONEL Guevara       Subjective:   Ketan Carter ondansetron HCl (ZOFRAN) injection 4 mg, 4 mg, Intravenous, Q6H PRN    •  Albuterol Sulfate  (90 Base) MCG/ACT inhaler 2 puff, 2 puff, Inhalation, Daily    •  dilTIAZem (DILACOR XR) 24 hr cap 120 mg, 120 mg, Oral, Daily    •  Metoprolol Succinate ER initial encounter  Likely mechanical in nature   - reviewed ct head and neck   - pt/ot      Afib - ekg with Afib normal rate.    - cont home meds   - monitor on tele     H/o copd   - pulm consult appreciated   - reviewed abg - well compensated   - cont inha

## 2020-11-22 ENCOUNTER — APPOINTMENT (OUTPATIENT)
Dept: MRI IMAGING | Facility: HOSPITAL | Age: 85
DRG: 947 | End: 2020-11-22
Attending: Other
Payer: MEDICARE

## 2020-11-22 PROCEDURE — 99231 SBSQ HOSP IP/OBS SF/LOW 25: CPT | Performed by: OTHER

## 2020-11-22 PROCEDURE — 99233 SBSQ HOSP IP/OBS HIGH 50: CPT | Performed by: HOSPITALIST

## 2020-11-22 RX ORDER — WARFARIN SODIUM 5 MG/1
5 TABLET ORAL NIGHTLY
Status: DISCONTINUED | OUTPATIENT
Start: 2020-11-22 | End: 2020-11-24

## 2020-11-22 RX ORDER — CASTOR OIL AND BALSAM, PERU 788; 87 MG/G; MG/G
OINTMENT TOPICAL 2 TIMES DAILY PRN
Status: DISCONTINUED | OUTPATIENT
Start: 2020-11-22 | End: 2020-12-02

## 2020-11-22 NOTE — PLAN OF CARE
At 1215, pt's b/p dropped to 89/58, pt was very lethargic, arousable but very tired. 250 cc bolus given and at 1335 b/p was up to 108/61, pt more alert, daughter at bedside.     Was able to get patient to [facility] and she was unable to complete the exam because

## 2020-11-22 NOTE — PLAN OF CARE
Problem: RESPIRATORY - ADULT  Goal: Achieves optimal ventilation and oxygenation  Description: INTERVENTIONS:  - Assess for changes in respiratory status  - Assess for changes in mentation and behavior  - Position to facilitate oxygenation and minimize r report changes in neurological status  - Initiate measures to prevent increased intracranial pressure  - Maintain blood pressure and fluid volume within ordered parameters to optimize cerebral perfusion and minimize risk of hemorrhage  - Monitor temperatur

## 2020-11-22 NOTE — PROGRESS NOTES
Pulmonary/Critical Care Follow Up Note    HPI:   Anum Naranjo is a 80year old female with Patient presents with:  Fall      PCP Wilbert Geller  Admission Attending Amber Meredith 15 Day #3    Denies sob  Denies pain  Denies fever mg, Oral, Daily      Allergies:    Albuterol               OTHER (SEE COMMENTS)    Comment:Atrial fibrillation        PHYSICAL EXAM:   Blood pressure 103/63, pulse 116, temperature 98.1 °F (36.7 °C), temperature source Axillary, resp.  rate 20, height 5' 4\

## 2020-11-22 NOTE — PROGRESS NOTES
[place] [facility]    Progress Note    Ram Morning Patient Status:  Inpatient    1930 MRN [id number]   Location [facility] 3W/SW Attending Maya Zacarias, [address] [facility] Day # 4 PCP JONEL Bah Patience       Subjective:   Promise Nguyen Rectal, Daily PRN    •  Fleet Enema (FLEET) 7-19 GM/118ML enema 133 mL, 1 enema, Rectal, Once PRN    •  ondansetron HCl (ZOFRAN) injection 4 mg, 4 mg, Intravenous, Q6H PRN    •  Albuterol Sulfate  (90 Base) MCG/ACT inhaler 2 puff, 2 puff, Inhalation delerium from concussion   - apprec neuro consult - mri ordered   - b12, tsh ok   - cont remeron   - cont zyprexa       Supratherapeutic INR  - inr now low. Holding coumadin due to confusion. Ok to restart today.  INR pending        Fall, initial encounter

## 2020-11-22 NOTE — PLAN OF CARE
Patient very lethargic today, easily aroused, states \"she's tired\", pt went to MRI but was unable to complete the exam d/t trouble breathing while laying flat, notified Dr Marisa Edouard and Dr Susan Tello.  Pt's daughter, Naya Rivas, Is ok with pt being sedated for MRI supplementation based on oxygen saturation or ABGs  - Provide Smoking Cessation handout, if applicable  - Encourage broncho-pulmonary hygiene including cough, deep breathe, Incentive Spirometry  - Assess the need for suctioning and perform as needed  - Ass activity tolerance for standing, transferring and ambulating w/ or w/o assistive devices  - Assist with transfers and ambulation using safe patient handling equipment as needed  - Ensure adequate protection for wounds/incisions during mobilization  - Krystal Rice rounding maintained   - Bed alarm on   - PT consult  - No skid socks in place  - Frequent reorientation   Outcome: Progressing

## 2020-11-22 NOTE — PROGRESS NOTES
[facility] AND [facility]  Neurology Progress Note    Jt Aguilera Patient Status:  Inpatient    1930 MRN [id number]   Location [facility] 3W/SW Attending Diomedes Morfin, [address] Day # 4 PCP JONEL Neil     Subjective:  Jt Aguilera Daily        Objective:  Blood pressure 115/54, pulse 101, temperature 97.5 °F (36.4 °C), temperature source Oral, resp. rate 20, height 64\", weight 84 lb 4.8 oz (38.2 kg), SpO2 99 %.     Physical Exam:   11/21/20  0844 11/21/20  1553 11/21/20  1928 11/22/ jerk 0 bilateral symmetric     No clonus  No Babinski sign     Coordination:  Finger to nose slightly slow and mildly clumsy  Rapid alternating movements intact     Gait:  Leaning to one side      Lab Results   Component Value Date    WBC 5.7 11/21/2020

## 2020-11-23 ENCOUNTER — APPOINTMENT (OUTPATIENT)
Dept: MRI IMAGING | Facility: HOSPITAL | Age: 85
DRG: 947 | End: 2020-11-23
Attending: Other
Payer: MEDICARE

## 2020-11-23 PROCEDURE — 99231 SBSQ HOSP IP/OBS SF/LOW 25: CPT | Performed by: OTHER

## 2020-11-23 PROCEDURE — 70551 MRI BRAIN STEM W/O DYE: CPT | Performed by: OTHER

## 2020-11-23 PROCEDURE — 99233 SBSQ HOSP IP/OBS HIGH 50: CPT | Performed by: HOSPITALIST

## 2020-11-23 PROCEDURE — 99233 SBSQ HOSP IP/OBS HIGH 50: CPT | Performed by: INTERNAL MEDICINE

## 2020-11-23 RX ORDER — LORAZEPAM 2 MG/ML
0.5 INJECTION INTRAMUSCULAR ONCE AS NEEDED
Status: COMPLETED | OUTPATIENT
Start: 2020-11-23 | End: 2020-11-23

## 2020-11-23 RX ORDER — SODIUM CHLORIDE 9 MG/ML
INJECTION, SOLUTION INTRAVENOUS CONTINUOUS
Status: DISCONTINUED | OUTPATIENT
Start: 2020-11-23 | End: 2020-11-25

## 2020-11-23 RX ORDER — HALOPERIDOL 5 MG/ML
0.5 INJECTION INTRAMUSCULAR ONCE AS NEEDED
Status: COMPLETED | OUTPATIENT
Start: 2020-11-23 | End: 2020-11-23

## 2020-11-23 NOTE — PROGRESS NOTES
[facility] AND [facility]  Neurology Progress Note    Usman Brunson Patient Status:  Inpatient    1930 MRN [id number]   Location [facility] 3W/SW Attending Lianne Quintero, [address] [facility] Day # 5 PCP JONEL Springer     Subjective:  Usman Graceon 108/60 105/76 111/64   Pulse: 87 91 84 82   Resp: 20 20 22 18   Temp:  97.5 °F (36.4 °C)  97.6 °F (36.4 °C)   TempSrc:  Oral  Oral   SpO2: 98% 92% 99% 98%   Weight:       Height:           General: No apparent distress, very thin, well groomed.   Head- Norm

## 2020-11-23 NOTE — PROGRESS NOTES
[facility]    Progress Note    Noel Ordoñez Patient Status:  Inpatient    1930 MRN [id number]   Location [facility] 3W/SW Attending Vicky Davis, [address] [facility] Day # 5 PCP JONEL Beckman       Subjective:   Mely Jamison PRN    •  Albuterol Sulfate  (90 Base) MCG/ACT inhaler 2 puff, 2 puff, Inhalation, Daily    •  dilTIAZem (DILACOR XR) 24 hr cap 120 mg, 120 mg, Oral, Daily    •  Metoprolol Succinate ER (Toprol XL) 24 hr tab 25 mg, 25 mg, Oral, Daily        Lab Resu now.  Discussed this also with patient's daughter Salvatore Lima. Discussed multiple times that I feel her agitation on day 1 of hospitalization was due to sundowning and/or dementia.   Patient now is suffering from side effects of Remeron Haldol and Zyprexa which

## 2020-11-23 NOTE — PHYSICAL THERAPY NOTE
Attempted treatment pt on hold today per RN. Pt was sadated for MRI and is not appropriate at this time. Will follow up tomorrow as appropriate.

## 2020-11-23 NOTE — DIETARY NOTE
ADULT NUTRITION REASSESSMENT    Pt is at high nutrition risk. Pt meets severe malnutrition criteria.       CRITERIA FOR MALNUTRITION DIAGNOSIS:  Criteria for severe malnutrition diagnosis: chronic illness related to energy intake less than 75% for greater noted.         ESTIMATED NUTRITION NEEDS: Dosing wt: 38.2 kg  Calories: 1337 calories/day (35 calories per kg Current wt)  Protein: 57.3-69 grams protein/day (1.5-1.8 grams protein per kg Current wt)  Fluid needs: 1 ml/kcal or per clinical status    NUTRIT lb)  12/22/18 : 46.7 kg (103 lb)    GASTROINTESTINAL: GI intact and +BM 11/19    FOOD/NUTRITION RELATED HISTORY:  Appetite: Poor  Intake: <25%; refusing some meals   Intake Meeting Needs: No, but supplements to maximize  Percent Meals Eaten (last 3 days) status    Zain Irizarry [id number]

## 2020-11-24 PROCEDURE — 99232 SBSQ HOSP IP/OBS MODERATE 35: CPT | Performed by: INTERNAL MEDICINE

## 2020-11-24 PROCEDURE — 99233 SBSQ HOSP IP/OBS HIGH 50: CPT | Performed by: HOSPITALIST

## 2020-11-24 PROCEDURE — 99231 SBSQ HOSP IP/OBS SF/LOW 25: CPT | Performed by: OTHER

## 2020-11-24 RX ORDER — ACETAMINOPHEN 325 MG/1
650 TABLET ORAL EVERY 6 HOURS PRN
Status: DISCONTINUED | OUTPATIENT
Start: 2020-11-24 | End: 2020-12-02

## 2020-11-24 RX ORDER — WARFARIN SODIUM 3 MG/1
3 TABLET ORAL
Status: COMPLETED | OUTPATIENT
Start: 2020-11-24 | End: 2020-11-24

## 2020-11-24 NOTE — PROGRESS NOTES
[facility]     Progress Note        Grabiel Tena Patient Status:  Inpatient    1930 MRN [id number]   Location [facility] 3W/SW Attending Mc Torres, [address] [facility] Day # 6 PCP Hernan Warren       Subjective:   Patient dilTIAZem (DILACOR XR) 24 hr cap 120 mg, 120 mg, Oral, Daily    •  Metoprolol Succinate ER (Toprol XL) 24 hr tab 25 mg, 25 mg, Oral, Daily        Continuous Infusions:   • sodium chloride 100 mL/hr at 11/24/20 4357       Physical Exam  Constitutional: Somn

## 2020-11-24 NOTE — CM/SW NOTE
Received MDO for Hospice. Per Jose Aldridge, MICHAEL, family is interested in informational meeting.   Jose Aldridge RN has already called [facility] [phone] to notify them of referral.    Addendum 11/24/2020  Received emailed copy of patient's HCPOA document from so

## 2020-11-24 NOTE — PROGRESS NOTES
[facility]    Progress Note      Assessment and Plan:   1. Hypercapnic respiratory failure–the patient has underlying COPD with marked frailty and now had received sedating medications.   She does arouse to follow all commands and is moving

## 2020-11-24 NOTE — PROGRESS NOTES
Patient this morning was to get an MRI, attempted yesterday and was unable to remain still. Sedation ordered by MD and administered by this RN just before patient went to MRI. Upon return from MRI patient was lethargic and only responding to touch.  PAtient

## 2020-11-24 NOTE — PROGRESS NOTES
The patient is sedated today due to Ativan and Haldol prior to her MRI. Otherwise I discontinue Zyprexa last night but family refused Remeron. Discuss with attending, Dr. Rachelle Abdullahi and we agree to sign off the case for now.

## 2020-11-24 NOTE — PHYSICAL THERAPY NOTE
Attempted physical therapy treatment. Patient was transferred to CCU on 11/23 and placed on Bipap. Per RN Moraima Pillai, patient not appropriate for participation. Will re-attempt on 11/25.

## 2020-11-24 NOTE — CM/SW NOTE
Son, Jyoti Tapia emailed copy of POA; MSW will place copy on med chart. RH will continue to be available for pts hospice needs.

## 2020-11-24 NOTE — HOSPICE RN NOTE
Hospice team met with pt daughter and son was on the phone. Discussed hospice philosophy and levels care. Pt daughter asked that we follow up with her tomorrow. Alexys Mcintosh RN on POC.

## 2020-11-24 NOTE — PROGRESS NOTES
[facility]  Neurology Progress Note    Shabnam Miguelangel Patient Status:  Inpatient    1930 MRN [id number]   Location [facility] 3W/SW Attending Martha Holbrook, [address] Day # 6 PCP JONEL Santos Shows     Subjective:  Shabnam Means 11/24/20  0418 11/24/20  0500 11/24/20  0549 11/24/20  0600   BP:  125/78  108/64   Pulse: 86 83  81   Resp: 19 15  21   Temp:       TempSrc:       SpO2: 98% 98%  90%   Weight:   94 lb (42.6 kg)    Height:           General: No apparent distress, very thin

## 2020-11-24 NOTE — PLAN OF CARE
Patient was hypotensive with a SBP of 87- bolus of NS 250ml was given and the SBP was sustained greater than 110. On Cont BIPAP, FIO2 was weaned from 40% to 35% per Dr. Daniel Gonzalez, no need to repeat ABG. Oxygen saturations were maintained above 92%.  Patient sti and oxygenation  Description: INTERVENTIONS:  - Assess for changes in respiratory status  - Assess for changes in mentation and behavior  - Position to facilitate oxygenation and minimize respiratory effort  - Oxygen supplementation based on oxygen saturat Pressure Ulcer prevention bundle as indicated  Outcome: Progressing     Problem: NEUROLOGICAL - ADULT  Goal: Achieves stable or improved neurological status  Description: INTERVENTIONS  - Assess for and report changes in neurological status  - Initiate kezia INTERVENTIONS:  - Assess patient stability and activity tolerance for standing, transferring and ambulating w/ or w/o assistive devices  - Assist with transfers and ambulation using safe patient handling equipment as needed  - Ensure adequate protection fo

## 2020-11-24 NOTE — PROGRESS NOTES
[facility]  Progress Note     Amy Lopez  : 1930    Status: Inpatient  Day #: 6    Attending: Shira Pinon MD  PCP: Elisha Hsieh      Assessment and Plan     Acute metabolic encephalopathy  Suspected dementia  -Possibly relate appropriate    Intake/Output Summary (Last 24 hours) at 11/24/2020 1320  Last data filed at 11/24/2020 0800  Gross per 24 hour   Intake 1714 ml   Output —   Net 1714 ml         Recent Labs   Lab 11/20/20  0854 11/21/20  0819 11/24/20  0418   WBC 5.4 5.7 5.

## 2020-11-24 NOTE — RESPIRATORY THERAPY NOTE
Received pt on CONT BIPAP with the settings of 14/5-35%. @11:50: Pt was more awake, alert and following commands. Attempted to wean pt to 10LHFNC- pt desaturated to 80%, RR 40. Then place pt ON VAPOTHERM 30L, 100%.  Pt is comfortable with breathin

## 2020-11-25 ENCOUNTER — APPOINTMENT (OUTPATIENT)
Dept: GENERAL RADIOLOGY | Facility: HOSPITAL | Age: 85
DRG: 947 | End: 2020-11-25
Attending: INTERNAL MEDICINE
Payer: MEDICARE

## 2020-11-25 PROCEDURE — 99233 SBSQ HOSP IP/OBS HIGH 50: CPT | Performed by: HOSPITALIST

## 2020-11-25 PROCEDURE — 71045 X-RAY EXAM CHEST 1 VIEW: CPT | Performed by: INTERNAL MEDICINE

## 2020-11-25 PROCEDURE — 99232 SBSQ HOSP IP/OBS MODERATE 35: CPT | Performed by: INTERNAL MEDICINE

## 2020-11-25 PROCEDURE — 99231 SBSQ HOSP IP/OBS SF/LOW 25: CPT | Performed by: OTHER

## 2020-11-25 NOTE — PROGRESS NOTES
Double RN skin check done prior to transfer off Unit. Skin check performed by this RN and Jazmin Comes. Wounds are as follows: small sacral stage II and small left lower extrem tear JEM. Will remain available for any further questions or concerns.

## 2020-11-25 NOTE — PROGRESS NOTES
[place] [facility] - [facility]     Progress Note        Destin Razo Patient Status:  Inpatient    1930 MRN [id number]   Location [facility] 3W/SW Attending Brianna Mcfarlane, [address] [facility] [address] Day # 7 PCP Elizabeth Portillo       Subjective:   Patient Intravenous, Q6H PRN    •  Albuterol Sulfate  (90 Base) MCG/ACT inhaler 2 puff, 2 puff, Inhalation, Daily    •  dilTIAZem (DILACOR XR) 24 hr cap 120 mg, 120 mg, Oral, Daily        Continuous Infusions:   • sodium chloride 75 mL/hr at 11/25/20 9594 floor    Malina How, DO  Pulmonary [address] [facility]

## 2020-11-25 NOTE — HOSPICE RN NOTE
Followed up with pt son Ranulfo, discussed hospice philosophy and services over the phone. He asked for more information to be emailed to him, hospice intake department will email him brochures. Will follow up with him tomorrow.   Called and updated NOMI Andrews

## 2020-11-25 NOTE — PROGRESS NOTES
[facility] AND [facility]  Neurology Progress Note    [place] Rus[place]ah Patient Status:  Inpatient    1930 MRN [id number]   Location [facility] 3W/SW Attending Hallie Chauhan, [address] Day # 7 PCP JONEL Denny     Subjective:  Demetria Gasca %.    Physical Exam:   11/25/20  0524 11/25/20  0600 11/25/20  0700 11/25/20  0800   BP:  120/70  105/61   Pulse: 91 91 80 82   Resp: 21 19 21 25   Temp:    97.8 °F (36.6 °C)   TempSrc:    Temporal   SpO2: 100% 100% 100% 100%   Weight: 94 lb (42.6 kg) episode of delirium and psychosis. Possibly due to the concussion. According to the family cognitively intact at the baseline. Advanced age. Significant muscle weight loss.    MRI was finally done, even though it is quite limited still no obvious acute

## 2020-11-25 NOTE — PLAN OF CARE
A&Ox4, confused/forgetful at times. Patient resting comfortably in bed, bed alarm on, call light within reach. Generalized bruising throughout body from fall at home. Failure to thrive. 3L O2, uses 2L O2 at home. CXRAY--worsening PNA.  MRI negative for acut oxygenation  Description: INTERVENTIONS:  - Assess for changes in respiratory status  - Assess for changes in mentation and behavior  - Position to facilitate oxygenation and minimize respiratory effort  - Oxygen supplementation based on oxygen saturation Ulcer prevention bundle as indicated  Outcome: Progressing     Problem: MUSCULOSKELETAL - ADULT  Goal: Return mobility to safest level of function  Description: INTERVENTIONS:  - Assess patient stability and activity tolerance for standing, transferring an appropriate  - Consider OT/PT consult to assist with strengthening/mobility  - Encourage toileting schedule  - Bed locked in lowest position  - Call light and belongings within reach  - Frequent rounding maintained   - Bed alarm on   - PT consult  - No ski

## 2020-11-25 NOTE — PROGRESS NOTES
[place] [facility] - [facility]  Progress Note     Zack Goldberg  : 1930    Status: Inpatient  Day #: 7    Attending: Hans Lock MD  PCP: Tyler Ruvalcaba      Assessment and Plan     Acute metabolic encephalopathy  Suspected dementia  -Possibly relate atraumatic  Cardiac:  irregular  Pulmonary:  Clear to auscultation bilaterally, respirations unlabored  Gastrointestinal:  Soft, non-tender, normal bowel sounds  Musculoskeletal:  No joint swelling  Extremities:  No edema, no cyanosis, no clubbing  Neurolo minerals  1 tablet Oral Daily   • acetaminophen  1,000 mg Oral Once   • Albuterol Sulfate HFA  2 puff Inhalation Daily   • dilTIAZem  120 mg Oral Daily      PRN Meds: acetaminophen, balsam peru-castor oil, Ipratropium Bromide, metoprolol Tartrate, ipratrop

## 2020-11-25 NOTE — PLAN OF CARE
Patient's neurological status improved throughout shift - A/O x 3 by EOS, situationally confused, but cooperative & pleasant. Bipap removed & transitioned to vapotherm, desaturated into the 70s when on NC. Cardiac stable.  Occasionally tachycardic, but INTERVENTIONS:  - Assess for changes in respiratory status  - Assess for changes in mentation and behavior  - Position to facilitate oxygenation and minimize respiratory effort  - Oxygen supplementation based on oxygen saturation or ABGs  - Provide Smoking as indicated  Outcome: Progressing     Problem: MUSCULOSKELETAL - ADULT  Goal: Return mobility to safest level of function  Description: INTERVENTIONS:  - Assess patient stability and activity tolerance for standing, transferring and ambulating w/ or w/o a OT/PT consult to assist with strengthening/mobility  - Encourage toileting schedule  - Bed locked in lowest position  - Call light and belongings within reach  - Frequent rounding maintained   - Bed alarm on   - PT consult  - No skid socks in place  - Jayy

## 2020-11-25 NOTE — PLAN OF CARE
Pt rec'd with breakfast, feeding herself, reports feeling very hungry but only consumed <25% meal. On 2l NC satting well, irreg HR rate controlled. Per pm rn scant urine output overnight. On maintenance fluids.  Pt slow to respond but alert and mostly orien supplementation based on oxygen saturation or ABGs  - Provide Smoking Cessation handout, if applicable  - Encourage broncho-pulmonary hygiene including cough, deep breathe, Incentive Spirometry  - Assess the need for suctioning and perform as needed  - Ass activity tolerance for standing, transferring and ambulating w/ or w/o assistive devices  - Assist with transfers and ambulation using safe patient handling equipment as needed  - Ensure adequate protection for wounds/incisions during mobilization  - Cara Gleason devices as appropriate  - Consider OT/PT consult to assist with strengthening/mobility  - Encourage toileting schedule  - Bed locked in lowest position  - Call light and belongings within reach  - Frequent rounding maintained   - Bed alarm on   - PT consul

## 2020-11-26 PROCEDURE — 99232 SBSQ HOSP IP/OBS MODERATE 35: CPT | Performed by: INTERNAL MEDICINE

## 2020-11-26 PROCEDURE — 99233 SBSQ HOSP IP/OBS HIGH 50: CPT | Performed by: HOSPITALIST

## 2020-11-26 RX ORDER — WARFARIN SODIUM 2 MG/1
2 TABLET ORAL
Status: COMPLETED | OUTPATIENT
Start: 2020-11-26 | End: 2020-11-26

## 2020-11-26 RX ORDER — VANCOMYCIN HYDROCHLORIDE 125 MG/1
125 CAPSULE ORAL DAILY
Status: DISCONTINUED | OUTPATIENT
Start: 2020-11-26 | End: 2020-11-26

## 2020-11-26 NOTE — PROGRESS NOTES
[facility] Dosing Service  Antibiotic Dosing    Denver Barrett is a 80year old for whom pharmacy is dosing Zosyn for treatment of  pneumonia. Allergies: is allergic to albuterol. Vitals: /87 (BP Location: Right arm)   Pulse 102   Temp 97. 8

## 2020-11-26 NOTE — PLAN OF CARE
Alert and oriented. Daughters Desiree Pratt updated. Scott in place. Diminished urine output. Pt removed O2 accidentally, pt 75% on RA. Pt still on 3L O2, saturation in the 90s. Bipap worn. Large, incontinent bowel movement.  Pt did not eat dinner, however h minimize respiratory effort  - Oxygen supplementation based on oxygen saturation or ABGs  - Provide Smoking Cessation handout, if applicable  - Encourage broncho-pulmonary hygiene including cough, deep breathe, Incentive Spirometry  - Assess the need for s INTERVENTIONS:  - Assess patient stability and activity tolerance for standing, transferring and ambulating w/ or w/o assistive devices  - Assist with transfers and ambulation using safe patient handling equipment as needed  - Ensure adequate protection fo belongings within reach  - Frequent rounding maintained   - Bed alarm on   - PT consult  - No skid socks in place  - Frequent reorientation   Outcome: Progressing     Problem: Delirium  Goal: Minimize duration of delirium  Description: Interventions:  - En

## 2020-11-26 NOTE — PROGRESS NOTES
[facility]     Progress Note        Pascual Paez Patient Status:  Inpatient    1930 MRN [id number]   Location [facility] 3W/SW Attending Kathy Andrews, [address] [facility] Day # 8 PCP Rosana Jacob       Subjective:   Patient Rectal, Once PRN    •  ondansetron HCl (ZOFRAN) injection 4 mg, 4 mg, Intravenous, Q6H PRN    •  Albuterol Sulfate  (90 Base) MCG/ACT inhaler 2 puff, 2 puff, Inhalation, Daily    •  dilTIAZem (DILACOR XR) 24 hr cap 120 mg, 120 mg, Oral, Daily prophylaxis: Coumadin      Bee Romeo, DO  Pulmonary [address] [place] [address]

## 2020-11-26 NOTE — PLAN OF CARE
On 3L of O2- per night RN pt did not wear BIPAP last night. Pt is drowsy but A&O x4. This RN educated pt on importance of wearing BIPAP at night. Pt refused to wear during daytime naps today. Speech consulted- diet modified & video swallow ordered.  Elsa mclean - monitor neuro status   - repeat head CT ordered   - UA/culture ordered  - monitor continuous pulse ox                                                                - See additional Care Plan goals for specific interventions  Outcome: Progressing     P infection  Description: INTERVENTIONS:  - Assess and document risk factors for pressure ulcer development  - Assess and document skin integrity  - Assess and document dressing/incision, wound bed, drain sites and surrounding tissue  - Implement wound care physical needs  - Identify cognitive and physical deficits and behaviors that affect risk of falls.   - Bally fall precautions as indicated by assessment.  - Educate pt/family on patient safety including physical limitations  - Instruct pt to call for a

## 2020-11-26 NOTE — SLP NOTE
ADULT SWALLOWING EVALUATION    ASSESSMENT    ASSESSMENT/OVERALL IMPRESSION:  PPE REQUIRED. THIS SLP WORE GOGGLES, GLOVES, AND DROPLET MASK. HANDS SANITIZED/WASHED UPON ENTRANCE/EXIT. Pt presents with moderate oropharyngeal dysphagia.   Pt with inconsist disorder [facility])      Past Medical History  Past Medical History:   Diagnosis Date   • Atrial fibrillation ([facility]    • Congestive heart disease ([facility]    • COPD (chronic obstructive pulmonary disease) ([facility]    • Essential hypertension    • Pneumonia due to org silent aspiration.)    Esophageal Phase of Swallow: No complaints consistent with possible esophageal involvement                GOALS  Goal #1 The patient will tolerate puree consistency and nectar thick liquids without overt signs or symptoms of aspirati

## 2020-11-26 NOTE — PROGRESS NOTES
[place] [facility] - [facility]  Progress Note     Lelon Lombard  : 1930    Status: Inpatient  Day #: 8    Attending: John Radford MD  PCP: Tiaog Hernandez      Assessment and Plan     Acute metabolic encephalopathy  Suspected dementia  -Possibly relate improved  ______________________________    DVT prophylaxis:  Coumadin  Code status: TBD    Diet:  cardiac  PT/OT Ordered:  Yes  Dispo:  Home with daughter Mary Sarmiento and part time caregivers  ______________________________     Subjective     Awake, answers ques worsening in the chest with moderate left base consolidation suggesting worsening pneumonia and or atelectasis, and developing right basal pneumonia with small right pleural effusion. Correlate clinically and follow-up studies are advised.     Dictated by

## 2020-11-27 ENCOUNTER — APPOINTMENT (OUTPATIENT)
Dept: GENERAL RADIOLOGY | Facility: HOSPITAL | Age: 85
DRG: 947 | End: 2020-11-27
Attending: HOSPITALIST
Payer: MEDICARE

## 2020-11-27 PROCEDURE — 71045 X-RAY EXAM CHEST 1 VIEW: CPT | Performed by: HOSPITALIST

## 2020-11-27 PROCEDURE — 99232 SBSQ HOSP IP/OBS MODERATE 35: CPT | Performed by: INTERNAL MEDICINE

## 2020-11-27 PROCEDURE — 74230 X-RAY XM SWLNG FUNCJ C+: CPT | Performed by: HOSPITALIST

## 2020-11-27 PROCEDURE — 99233 SBSQ HOSP IP/OBS HIGH 50: CPT | Performed by: HOSPITALIST

## 2020-11-27 NOTE — SLP NOTE
SPEECH DAILY NOTE - INPATIENT    ASSESSMENT & PLAN   ASSESSMENT      PPE REQUIRED. THIS SLP WORE GLOVES AND DROPLET MASK. HANDS SANITIZED/WASHED UPON ENTRANCE/EXIT. Pt alert, afebrile and on 3 L/min 02 via NC.  Pt seen for swallow analysis per VFSS rec position; No straw  Medication Administration Recommendations: Crushed in puree    Patient Experiencing Pain: No       Discharge Recommendations  Discharge Recommendations/Plan: Undetermined    Treatment Plan  Treatment Plan/Recommendations: Aspiration prec this session d/t fluctuating levels of alertness.      In Progress       FOLLOW UP  Follow Up Needed: Yes  SLP Follow-up Date: 11/28/20  Number of Visits to Meet Established Goals: 4    Session: 1 S/P VFSS    If you have any questions, please contact     Ma

## 2020-11-27 NOTE — DIETARY NOTE
ADULT NUTRITION REASSESSMENT    Pt is at high nutrition risk. Pt meets severe malnutrition criteria.       CRITERIA FOR MALNUTRITION DIAGNOSIS:  Criteria for severe malnutrition diagnosis: chronic illness related to energy intake less than 75% for greater noted.   11/27/20- Pt very lethargic, sleeping in chair at time of visit, no family at bedside. PO intake remains poor (average 37.5% for last 4 meals recorded. Chart reviewed & spoke with RN caring for pt, family requesting Hospice eval meeting.        EST 11/27/20 0557 44 kg (97 lb 1.6 oz)   11/26/20 0500 40.9 kg (90 lb 3.2 oz)   11/25/20 0524 42.6 kg (94 lb)   11/24/20 0549 42.6 kg (94 lb)   11/19/20 0400 38.2 kg (84 lb 4.8 oz)   11/18/20 1155 40 kg (88 lb 3.2 oz)   11/18/20 0415 40.4 kg (89 lb)     Wt R pressure injury on R buttocks.     MONITOR AND EVALUATE/NUTRITION GOALS:  - Food and Nutrient Intake:      Monitor: adequacy of PO intake, tolerance of PO intake, adequacy of supplement intake, tolerance of supplement intake and need for supplemental entera

## 2020-11-27 NOTE — PROGRESS NOTES
[place] [facility] - [facility]     Progress Note        Shannon Grant Patient Status:  Inpatient    1930 MRN [id number]   Location [facility] 3W/SW Attending Clement Lam, [address] [facility] Day # 9 PCP Lavelle Michaels       Subjective:   Patient PRN    •  Fleet Enema (FLEET) 7-19 GM/118ML enema 133 mL, 1 enema, Rectal, Once PRN    •  ondansetron HCl (ZOFRAN) injection 4 mg, 4 mg, Intravenous, Q6H PRN    •  Albuterol Sulfate  (90 Base) MCG/ACT inhaler 2 puff, 2 puff, Inhalation, Daily respiratory failure. Requiring BiPAP.   Recommend nightly BiPAP although patient refusing at times but did use overnight.  -Nebulizer treatments  -Hold sedatives at this time and moving forward  -Nightly BiPAP therapy  -Currently on 3 L nasal cannula oxyge

## 2020-11-27 NOTE — HOSPICE RN NOTE
Left voice mail message for POA son Ranulfo to return call if questions regarding hospice also noted that family had questions about video swallow and we could answer these questions also. Saad Sommers RN reports daughter Jimena Souza at bedside wants video swallow today.

## 2020-11-27 NOTE — RESPIRATORY THERAPY NOTE
Worked with patient on breathing exercise. Patient is up on the chair, alert and following commands. Patient is saturating well on NC 2.5L, no distress noted. Will continue to monitor patient.

## 2020-11-27 NOTE — PLAN OF CARE
Patient wore bipap all night, although patient not happy. Daughter Shannan Tubbs called and was updated on mom overnight, she would like to speak to someone about the video swallow before procedure is done. Monitoring.     Problem: RESPIRATORY - ADULT  Goal: Ellis Devries document risk factors for pressure ulcer development  - Assess and document skin integrity  - Assess and document dressing/incision, wound bed, drain sites and surrounding tissue  - Implement wound care per orders  - Initiate isolation precautions as appro strengthening/mobility  - Encourage toileting schedule  - Bed locked in lowest position  - Call light and belongings within reach  - Frequent rounding maintained   - Bed alarm on   - PT consult  - No skid socks in place  - Frequent reorientation   Outcome:

## 2020-11-27 NOTE — PLAN OF CARE
Pt & daughter called this RN into room stating pt felt short of breath. Pulse Ox reading 65% on 2L of O2, good wave form. Pt alert and responding. This RN stayed with patient and increased O2 to 5L, pt's O2 sats immediately increased to 95%.  Pt states gricelda

## 2020-11-27 NOTE — RESPIRATORY THERAPY NOTE
Called by RN to place pt on BIPAP, pt would like to eat now. No distress noted at this time. Patient is sating above 93%. RN Fish Person aware.

## 2020-11-27 NOTE — PHYSICAL THERAPY NOTE
PHYSICAL THERAPY EVALUATION - INPATIENT     Room Number: 335/335-A  Evaluation Date: 11/27/2020  Type of Evaluation: Re-evaluation   Physician Order: See Comment for Specific Order(re eval)    Presenting Problem: supratherpeutic INR(recent fall)  Reason f pt is currently not ambulatory with 100% fall risk on wilson balance scale. Patient will benefit from continued IP PT services to address these deficits in preparation for discharge.     DISCHARGE RECOMMENDATIONS  PT Discharge Recommendations: Home with h Bed/chair alarm  Fall Risk: High fall risk    WEIGHT BEARING RESTRICTION  Weight Bearing Restriction: None                PAIN ASSESSMENT  Ratin  Location: LE pain with movement  Management Techniques: Activity promotion; Body mechanics;Breathing techni tolerated  Gait training  Posture  ROM  Strengthening  Lower therapeutic exercise: Ankle pumps  Heel slides  SLR  Transfer training    Patient End of Session: Up in chair; With [facility] staff;Needs met;Call light within reach;Bracing education provided; All patie

## 2020-11-27 NOTE — PROGRESS NOTES
[place] [facility] - [facility]    Progress Note    Shabnam Means Patient Status:  Inpatient    1930 MRN [id number]   Location [facility] 3W/SW Attending Sammie Cantu MD   [facility] Day # 9 PCP JONEL Santos Shows        Subjective:   Jessee Christie worsening L base consolidation and developing R basal pneumonia with small pleural effusion  -zosyn IV started. -ST eval - pureed nectar thick diet  -video swallow pending     Supratherapeutic INR  -coumadin restarted.   -INR sub therapeutic today  -erik 11/27/2020    HGB 9.1 (L) 11/27/2020    HCT 31.0 (L) 11/27/2020    .0 11/27/2020    CREATSERUM 0.46 (L) 11/27/2020    BUN 18 11/27/2020     11/27/2020    K 4.5 11/27/2020     11/27/2020    CO2 39.0 (H) 11/27/2020     (H) 11/27/202

## 2020-11-27 NOTE — CM/SW NOTE
Order and F2F entered in Epic for [place] [id number] services. Sent in [address] Junaid Aleman to [facility],[address]. They have not responded in [place].

## 2020-11-27 NOTE — SLP NOTE
[facility] ADULT VIDEOFLUOROSCOPIC SWALLOWING STUDY    Admission Date: 11/18/2020  Evaluation Date: 11/27/20  Radiologist: Dr. Casi Torres    PPE REQUIRED. THIS SLP WORE GLOVES, AND DROPLET MASK. HANDS SANITIZED/WASHED UPON ENTRANCE/EXIT.       PLAN[facility]: [facility]   DYSPHAGIA ASSESSMENT  Test completed in conjunction with Radiologist.  Patient Positioned: Upright;Midline. Patient Viewed: Lateral.  Patient Alertness: Lethargic. Consistencies Presented: Thin liquids; Nectar thick liquids;Puree received in radiology with no dentures). Pt with reduced/inconsistent levels of alertness. Reduced lingual control, formation and A-P transit of pureed bolus. Reduced tongue-base retraction skills. Hyolaryngeal excursion and epiglottic inversion present.  * Experiencing Pain: No      FOLLOW UP  Treatment Plan/Recommendations: Aspiration precautions  Number of Visits to Meet Established Goals: 4      Thank you for your referral.   If you have any questions, please contact     Aj Mcnair M.S. WILLIAMS/SLP

## 2020-11-28 PROCEDURE — 99233 SBSQ HOSP IP/OBS HIGH 50: CPT | Performed by: INTERNAL MEDICINE

## 2020-11-28 PROCEDURE — 99233 SBSQ HOSP IP/OBS HIGH 50: CPT | Performed by: HOSPITALIST

## 2020-11-28 NOTE — PROGRESS NOTES
[facility]    Progress Note    Bess Ortiz Patient Status:  Inpatient    1930 MRN [id number]   Location [facility] 3W/SW Attending Tabatha Talbot MD   [facility] Day # 10 PCP Marla Mcgill        Subjective:   Yash Lobo 11/28/2020    CO2 40.0 (HH) 11/28/2020    GLU 93 11/28/2020    CA 9.1 11/28/2020    ALB 2.7 (L) 05/06/2019    ALKPHO 70 05/06/2019    BILT 0.4 05/06/2019    TP 5.7 (L) 05/06/2019    AST 18 05/06/2019    ALT 15 05/06/2019    INR 1.83 (H) 11/28/2020    TSH 1

## 2020-11-28 NOTE — PROGRESS NOTES
[place] [facility] - [facility]    Progress Note    Lynda Avendano Patient Status:  Inpatient    1930 MRN [id number]   Location [facility] 3W/SW Attending Jacinto Stanton MD   [facility] Day # 10 PCP Ezra Cook        Subjective:   Miranda Pollack effusion  -zosyn IV started. -ST eval - pureed nectar thick diet  -video swallow pending     Supratherapeutic INR  -coumadin restarted.   -INR sub therapeutic today  -continue warfarin  -monitor INR     Mechanical fall  Head trauma and suspected concussio CREATSERUM 0.41 (L) 11/28/2020    BUN 19 (H) 11/28/2020     11/28/2020    K 4.3 11/28/2020     11/28/2020    CO2 40.0 (HH) 11/28/2020    GLU 93 11/28/2020    CA 9.1 11/28/2020    ALB 2.7 (L) 05/06/2019    ALKPHO 70 05/06/2019    BILT 0.4 05/06/

## 2020-11-28 NOTE — PLAN OF CARE
VSS. On 2-3 L NC. Pt up in the chair part of the day. IV abx continued. No new complains. Will continue to monitor pt. Problem: Patient Centered Care  Goal: Patient preferences are identified and integrated in the patient's plan of care  Description:  In perform as needed  - Assess and instruct to report SOB or any respiratory difficulty  - Respiratory Therapy support as indicated  - Manage/alleviate anxiety  - Monitor for signs/symptoms of CO2 retention  Outcome: Not Progressing     Problem: METABOLIC/FLU

## 2020-11-28 NOTE — PLAN OF CARE
Patient wore bipap tonight from 2200 until 0345 when she insisted she wouldn't wear it anymore. Mentation this morning clearer, sat in room and had a good discussion about cooking and about dogs in her life. More alert this morning.  Complaints of sore thro interventions, skin care algorithm/standards of care as needed  Outcome: Progressing     Problem: NEUROLOGICAL - ADULT  Goal: Achieves stable or improved neurological status  Description: INTERVENTIONS  - Assess for and report changes in neurological statu function  Description: INTERVENTIONS:  - Assess patient stability and activity tolerance for standing, transferring and ambulating w/ or w/o assistive devices  - Assist with transfers and ambulation using safe patient handling equipment as needed  - Ensure

## 2020-11-28 NOTE — CM/SW NOTE
SW went to room to meet with pt and daughter.  Upon arrival she stated that they have decided to use a different company, When asked which company so I can inform  to send over referral, she stated that she did not know that they are not at that

## 2020-11-28 NOTE — SLP NOTE
SPEECH DAILY NOTE - INPATIENT    ASSESSMENT & PLAN   ASSESSMENT    PPE REQUIRED. THIS SLP WORE GOGGLES, GLOVES AND DROPLET MASK. HANDS SANITIZED/WASHED UPON ENTRANCE/EXIT. SLP f./u x2/4 for diet texture analysis following VFSS recommendations.  MICHAEL Costa recommendations of NTL via tsp and puree solid textures. Safe swallowing precautions and aspiration precautions in place. STRICT adherence to aspiration precautions recommended.  1:1 feeding assistance/supervision to promote safety with intake and carry-ove patient/family/caregiver will demonstrate understanding and implementation of aspiration precautions and swallow strategies independently over 4 session(s).     Swallowing precautions reviewed with pt; subsequent swallow consistent following all trials with

## 2020-11-29 PROCEDURE — 99233 SBSQ HOSP IP/OBS HIGH 50: CPT | Performed by: HOSPITALIST

## 2020-11-29 PROCEDURE — 99232 SBSQ HOSP IP/OBS MODERATE 35: CPT | Performed by: INTERNAL MEDICINE

## 2020-11-29 NOTE — SLP NOTE
SPEECH DAILY NOTE - INPATIENT    ASSESSMENT & PLAN   ASSESSMENT  PPE REQUIRED. THIS SLP WORE FACE SHIELD, GLOVES, AND DROPLET MASK. HANDS SANITIZED/WASHED UPON ENTRANCE/EXIT.     Per RN, pt is tolerating current diet without overt clinical signs or symptoms accuracy. Nectar thick liquids via cup assessed. Patient with large intake of nectar thick liquids via cup resulting in increased SOB. Trials of nectar thick liquids via cup discontinued.     In Progress   Goal #2 The patient/family/caregiver will demons

## 2020-11-29 NOTE — OCCUPATIONAL THERAPY NOTE
OCCUPATIONAL THERAPY EVALUATION - INPATIENT     Room Number: 335/335-A  Evaluation Date: 11/29/2020  Type of Evaluation: Initial  Presenting Problem: supratherapeutic INR; acute metabolic envephalopathy    Physician Order: IP Consult to [facility] RECOMMENDATIONS  OT Discharge Recommendations: 24 hour care/supervision;Home with home health PT/OT  OT Device Recommendations: TBD    PLAN  OT Treatment Plan: Balance activities; Energy conservation/work simplification techniques;ADL training;Functional tr is within functional limits     STRENGTH ASSESSMENT  Upper extremity strength is within functional limits    COORDINATION  Gross Motor: WFL   Fine Motor: impaired in B hands due to arthritis      ACTIVITIES OF DAILY LIVING ASSESSMENT  AM-PAC ‘6-Clicks’ Inp 12/13/2020  Frequency: 3-5x/wk

## 2020-11-29 NOTE — PLAN OF CARE
Problem: Patient Centered Care  Goal: Patient preferences are identified and integrated in the patient's plan of care  Description: Interventions:  - What would you like us to know as we care for you?  I live at home with my daughter  - Provide timely, co Manage/alleviate anxiety  - Monitor for signs/symptoms of CO2 retention  Outcome: Progressing     Problem: METABOLIC/FLUID AND ELECTROLYTES - ADULT  Goal: Electrolytes maintained within normal limits  Description: INTERVENTIONS:  - Monitor labs and rhythm limitations with patient/family  Outcome: Progressing     Problem: NEUROLOGICAL - ADULT  Goal: Achieves stable or improved neurological status  Description: INTERVENTIONS  - Assess for and report changes in neurological status  - Initiate measures to preve blinds open  - Promote sleep, encourage patient's normal rest cycle i.e. lights off, TV off, minimize noise and interruptions  - Encourage family to assist in orientation and promotion of home routines  Outcome: Progressing     Pt A&Ox4, forgetful, on 2L O

## 2020-11-29 NOTE — HOSPICE RN NOTE
Called and spoke with pt son/POA Ranulfo, he states they will not be moving forward with hospice at this time.

## 2020-11-29 NOTE — PLAN OF CARE
Patient wore bipap with new smaller mask tonight from 0010 until 0220 when she insisted upon its removal. She was asking for a sandwich tonight, which is not on her puree diet plan, she refused pudding or apple sauce. Monitoring.       Problem: RESPIRATORY INTERVENTIONS:  - Assess and document risk factors for pressure ulcer development  - Assess and document skin integrity  - Assess and document dressing/incision, wound bed, drain sites and surrounding tissue  - Implement wound care per orders  - Initiate i sleep, encourage patient's normal rest cycle i.e. lights off, TV off, minimize noise and interruptions  - Encourage family to assist in orientation and promotion of home routines  Outcome: Progressing     Problem: MUSCULOSKELETAL - ADULT  Goal: Return mobi

## 2020-11-29 NOTE — PROGRESS NOTES
[facility]    Progress Note    Brook Boss Patient Status:  Inpatient    1930 MRN [id number]   Location [facility] 3W/SW Attending Gary Stewart MD   [facility] Day # 11 PCP JONEL Ayala        Subjective:   Kp Julio

## 2020-11-29 NOTE — PROGRESS NOTES
[place] [facility] [place] [facility]    Progress Note    Mykel Wyman Patient Status:  Inpatient    1930 MRN [id number]   Location [facility] 3W/SW Attending Cathy Enriquez MD   [facility] Day # 11 PCP JONEL Carter        Subjective:   Kala Ayala pleural effusion  -zosyn IV started. -ST eval - pureed nectar thick diet  -video swallow pending     Supratherapeutic INR  -coumadin restarted.   -INR sub therapeutic today  -continue warfarin  -monitor INR     Mechanical fall  Head trauma and suspected c Value Date    WBC 6.8 11/29/2020    HGB 8.7 (L) 11/29/2020    HCT 29.8 (L) 11/29/2020    .0 11/29/2020    CREATSERUM 0.41 (L) 11/29/2020    BUN 18 11/29/2020     11/29/2020    K 4.2 11/29/2020     11/29/2020    CO2 42.0 ([place]) 11/29/2020

## 2020-11-30 PROBLEM — E46 MALNUTRITION (HCC): Status: ACTIVE | Noted: 2020-11-30

## 2020-11-30 PROCEDURE — 99233 SBSQ HOSP IP/OBS HIGH 50: CPT | Performed by: INTERNAL MEDICINE

## 2020-11-30 RX ORDER — WARFARIN SODIUM 3 MG/1
3 TABLET ORAL NIGHTLY
Status: DISCONTINUED | OUTPATIENT
Start: 2020-11-30 | End: 2020-12-02

## 2020-11-30 NOTE — PROGRESS NOTES
[facility]    Progress Note    Brook Boss Patient Status:  Inpatient    1930 MRN [id number]   Location [facility] 3W/SW Attending Gary Stewart MD   [facility] Day # 12 PCP JONEL Ayala        Subjective:   Kp Julio nectar thick diet  -video swallow, Evidence of penetration but no obvious aspiration.   - check EKG     Supratherapeutic INR  -INR sub therapeutic  -resume warfarin  -monitor INR     Mechanical fall  Head trauma and suspected concussion  -CT brain and C-spi 11/30/2020     11/30/2020    CO2 40.0 (HH) 11/30/2020    GLU 85 11/30/2020    CA 8.8 11/30/2020    ALB 2.7 (L) 05/06/2019    ALKPHO 70 05/06/2019    BILT 0.4 05/06/2019    TP 5.7 (L) 05/06/2019    AST 18 05/06/2019    ALT 15 05/06/2019    INR 1.83 (H

## 2020-11-30 NOTE — SLP NOTE
SPEECH DAILY NOTE - INPATIENT    ASSESSMENT & PLAN   ASSESSMENT    Patient seen for dysphagia f/u per plan of care, received alert but drowsy, increased WOB at baseline with SpO2 93% per monitor with O2 via 2l/min NC, afebrile, minimal verbal output but re TSP/TRIAL cup without overt signs or symptoms of aspiration with 100 % accuracy over 2 session(s).     The patient tolerated pureed consistency and nectar thick liquids via tsp without overt signs or symptoms of aspiration, administered by SLP.   In [place]

## 2020-11-30 NOTE — CM/SW NOTE
12/1 113pm: ELOISE faxed medical information to [address] via [employer] to fax [phone].   ELOISE notified the pt's son Gene of the above.   -----------------------  12/1 1014am: ELOISE spoke with the pt's son Gene (POA) who is agreeable to a referral to [facility]

## 2020-11-30 NOTE — PLAN OF CARE
Problem: Patient Centered Care  Goal: Patient preferences are identified and integrated in the patient's plan of care  Description: Interventions:  - What would you like us to know as we care for you?  I live at home with my daughter  - Provide timely, co Manage/alleviate anxiety  - Monitor for signs/symptoms of CO2 retention  Outcome: Not Progressing     Problem: METABOLIC/FLUID AND ELECTROLYTES - ADULT  Goal: Electrolytes maintained within normal limits  Description: INTERVENTIONS:  - Monitor labs and rhy limitations with patient/family  Outcome: Progressing     Problem: NEUROLOGICAL - ADULT  Goal: Achieves stable or improved neurological status  Description: INTERVENTIONS  - Assess for and report changes in neurological status  - Initiate measures to preve light within reach. Possible discharge back to home today.

## 2020-11-30 NOTE — PLAN OF CARE
Patient drowsy, forgetful, on 3L O2. IV zosyn. Scott remains in place. Pt complaining of shortness of breath throughout the day - O2 sats >92%, on oxygen, pt consistently refusing bipap, MD aware.  Daughter, Osvaldo Caceres, at bedside and updated on plan of care - s respiratory status  - Assess for changes in mentation and behavior  - Position to facilitate oxygenation and minimize respiratory effort  - Oxygen supplementation based on oxygen saturation or ABGs  - Provide Smoking Cessation handout, if applicable  - Enc Problem: MUSCULOSKELETAL - ADULT  Goal: Return mobility to safest level of function  Description: INTERVENTIONS:  - Assess patient stability and activity tolerance for standing, transferring and ambulating w/ or w/o assistive devices  - Assist with trans strengthening/mobility  - Encourage toileting schedule  - Bed locked in lowest position  - Call light and belongings within reach  - Frequent rounding maintained   - Bed alarm on   - PT consult  - No skid socks in place  - Frequent reorientation   Outcome:

## 2020-11-30 NOTE — PROGRESS NOTES
Pulmonary/Critical Care Follow Up Note    HPI:   Denver Barrett is a 80year old female with Patient presents with:  Fall      PCP Ephriam Bracket  Admission Attending Amber Lu 15 Day #12    C/o SOB  Wants to go home  Pt is confu puff, Inhalation, Daily      Allergies:    Albuterol               OTHER (SEE COMMENTS)    Comment:Atrial fibrillation        PHYSICAL EXAM:   Blood pressure 119/67, pulse 91, temperature 97.1 °F (36.2 °C), temperature source Oral, resp.  rate 18, height 5'

## 2020-11-30 NOTE — PHYSICAL THERAPY NOTE
PHYSICAL THERAPY TREATMENT NOTE - INPATIENT     Room Number: 335/335-A       Presenting Problem: supratherpeutic INR(recent fall)    Problem List  Principal Problem:    Supratherapeutic INR  Active Problems:    Fall, initial encounter    Delirium due to an with arms (e.g., wheelchair, bedside commode, etc.): A Lot   -   Moving from lying on back to sitting on the side of the bed?: A Lot   How much help from another person does the patient currently need. ..   -   Moving to and from a bed to a chair (including

## 2020-12-01 ENCOUNTER — TELEPHONE (OUTPATIENT)
Dept: INTERNAL MEDICINE UNIT | Facility: HOSPITAL | Age: 85
End: 2020-12-01

## 2020-12-01 PROCEDURE — 99232 SBSQ HOSP IP/OBS MODERATE 35: CPT | Performed by: INTERNAL MEDICINE

## 2020-12-01 PROCEDURE — 99233 SBSQ HOSP IP/OBS HIGH 50: CPT | Performed by: HOSPITALIST

## 2020-12-01 RX ORDER — IPRATROPIUM BROMIDE AND ALBUTEROL SULFATE 2.5; .5 MG/3ML; MG/3ML
3 SOLUTION RESPIRATORY (INHALATION) EVERY 6 HOURS PRN
Qty: 1 CONTAINER | Refills: 0 | Status: SHIPPED | OUTPATIENT
Start: 2020-12-01

## 2020-12-01 RX ORDER — MULTIPLE VITAMINS W/ MINERALS TAB 9MG-400MCG
1 TAB ORAL DAILY
Qty: 30 TABLET | Refills: 0 | Status: SHIPPED | OUTPATIENT
Start: 2020-12-02

## 2020-12-01 RX ORDER — AMOXICILLIN AND CLAVULANATE POTASSIUM 875; 125 MG/1; MG/1
1 TABLET, FILM COATED ORAL 2 TIMES DAILY
Qty: 14 TABLET | Refills: 0 | Status: SHIPPED | OUTPATIENT
Start: 2020-12-01 | End: 2020-12-08

## 2020-12-01 RX ORDER — WARFARIN SODIUM 3 MG/1
3 TABLET ORAL NIGHTLY
Qty: 20 TABLET | Refills: 0 | Status: SHIPPED | OUTPATIENT
Start: 2020-12-01 | End: 2020-12-02

## 2020-12-01 NOTE — PROGRESS NOTES
[facility]     Progress Note        Olena Ryder Patient Status:  Inpatient    1930 MRN [id number]   Location [facility] 3W/SW Attending Lexx Sadler, [address] 15 PCP Sim Hines       Subjective:   Patient (MILK OF MAGNESIA) 400 MG/5ML suspension 30 mL, 30 mL, Oral, Daily PRN    •  bisacodyl (DULCOLAX) rectal suppository 10 mg, 10 mg, Rectal, Daily PRN    •  Fleet Enema (FLEET) 7-19 GM/118ML enema 133 mL, 1 enema, Rectal, Once PRN    •  ondansetron HCl (ZOFR

## 2020-12-01 NOTE — PLAN OF CARE
Declined Bipap. IV Zosyn cont'd for aspiration pneumonia. Scott in place. Plan to d/c today if medically cleared.    Problem: Patient Centered Care  Goal: Patient preferences are identified and integrated in the patient's plan of care  Description: Interven needed  - Assess and instruct to report SOB or any respiratory difficulty  - Respiratory Therapy support as indicated  - Manage/alleviate anxiety  - Monitor for signs/symptoms of CO2 retention  Outcome: Progressing     Problem: METABOLIC/FLUID AND ELECTROL mobilization  - Obtain PT/OT consults as needed  - Advance activity as appropriate  - Communicate ordered activity level and limitations with patient/family  Outcome: Progressing     Problem: NEUROLOGICAL - ADULT  Goal: Achieves stable or improved neurolog eye glasses  - Promote highest level of mobility daily  - Provide frequent reorientation  - Promote wakefulness i.e. lights on, blinds open  - Promote sleep, encourage patient's normal rest cycle i.e. lights off, TV off, minimize noise and interruptions  -

## 2020-12-01 NOTE — PROGRESS NOTES
Pt more lethargic this afternoon. Bipap applied. Pt keeps taking the bipap off. MD notified. Pt educated on the importance of wearing Bipap, needs reinforcement.

## 2020-12-01 NOTE — CM/SW NOTE
Received notice from [facility], / Unable to accept due to staffing. Additional Referrals sent, pending clinical review. Palliative Care in the community referrals sent to [address] [place] [address].  LOLY Mohr 97 [phone]  Pending clini

## 2020-12-01 NOTE — SLP NOTE
SPEECH DAILY NOTE - INPATIENT    ASSESSMENT & PLAN   ASSESSMENT  PPE REQUIRED. THIS THERAPIST WORE GLOVES, DROPLET MASK, AND GOGGLES FOR DURATION OF TX SESSION. HANDS WASHED UPON ENTRANCE/EXIT.     SLP f/u for ongoing dysphagia tx/meal assessment per recomm Upright position; No straw  Medication Administration Recommendations: Crushed in puree    Patient Experiencing Pain: No       Discharge Recommendations  Discharge Recommendations/Plan: Undetermined    Treatment Plan  Treatment Plan/Recommendations: Irvin Mistry

## 2020-12-01 NOTE — PLAN OF CARE
Problem: Patient Centered Care  Goal: Patient preferences are identified and integrated in the patient's plan of care  Description: Interventions:  - What would you like us to know as we care for you?  I live at home with my daughter  - Provide timely, co Therapy support as indicated  - Manage/alleviate anxiety  - Monitor for signs/symptoms of CO2 retention  Outcome: Adequate for Discharge     Problem: METABOLIC/FLUID AND ELECTROLYTES - ADULT  Goal: Electrolytes maintained within normal limits  Description: Advance activity as appropriate  - Communicate ordered activity level and limitations with patient/family  Outcome: Adequate for Discharge     Problem: NEUROLOGICAL - ADULT  Goal: Achieves stable or improved neurological status  Description: INTERVENTIONS Interventions:  - Encourage use of hearing aids, eye glasses  - Promote highest level of mobility daily  - Provide frequent reorientation  - Promote wakefulness i.e. lights on, blinds open  - Promote sleep, encourage patient's normal rest cycle i.e. lights

## 2020-12-01 NOTE — RESPIRATORY THERAPY NOTE
Patient refused bipap therapy. [facility] has educated the patient on the purpose of and need for this therapy as well as potential negative outcomes associated with deferring treatment. Despite education, patient maintains refusal. No distress at this time. 2lnc.

## 2020-12-01 NOTE — PHYSICAL THERAPY NOTE
PHYSICAL THERAPY TREATMENT NOTE - INPATIENT     Room Number: 335/335-A       Presenting Problem: supratherpeutic INR(recent fall)    Problem List  Principal Problem:    Supratherapeutic INR  Active Problems:    Fall, initial encounter    Delirium due to an techniques;Relaxation;Repositioning    BALANCE                                                                                                                     Static Sitting: Fair -  Dynamic Sitting: Fair -           Static Standing: Poor -  Dynamic St 10 feet with assist device: walker - rolling at assistance level: moderate assistance   Goal #3   Current Status NT    Goal #4    Goal #4   Current Status    Goal #5 Patient to demonstrate independence with home activity/exercise instructions provided to p

## 2020-12-01 NOTE — PROGRESS NOTES
[facility]    Progress Note    Manual Stack Patient Status:  Inpatient    1930 MRN [id number]   Location [facility] 3W/SW Attending Ivonne Gutierrez MD   [facility] Day # 15 PCP JONEL Segundo        Subjective:   Dwayne Enriquez eval - pureed nectar thick diet  -video swallow, Evidence of penetration but no obvious aspiration.   - check EKG     Supratherapeutic INR  -INR sub therapeutic  -resume warfarin  -monitor INR     Mechanical fall  Head trauma and suspected concussion  -CT b HCT 35.0 12/01/2020    .0 12/01/2020    CREATSERUM 0.54 (L) 12/01/2020    BUN 21 (H) 12/01/2020     12/01/2020    K 4.3 12/01/2020     12/01/2020    CO2 40.0 (HH) 12/01/2020    GLU 76 12/01/2020    CA 9.1 12/01/2020    ALB 2.7 (L) 05/06/

## 2020-12-02 ENCOUNTER — TELEPHONE (OUTPATIENT)
Dept: INTERNAL MEDICINE UNIT | Facility: HOSPITAL | Age: 85
End: 2020-12-02

## 2020-12-02 VITALS
TEMPERATURE: 99 F | DIASTOLIC BLOOD PRESSURE: 64 MMHG | SYSTOLIC BLOOD PRESSURE: 108 MMHG | WEIGHT: 91.81 LBS | HEART RATE: 112 BPM | BODY MASS INDEX: 15.67 KG/M2 | RESPIRATION RATE: 16 BRPM | HEIGHT: 64 IN | OXYGEN SATURATION: 99 %

## 2020-12-02 PROCEDURE — 99239 HOSP IP/OBS DSCHRG MGMT >30: CPT | Performed by: HOSPITALIST

## 2020-12-02 PROCEDURE — 99232 SBSQ HOSP IP/OBS MODERATE 35: CPT | Performed by: INTERNAL MEDICINE

## 2020-12-02 RX ORDER — METOPROLOL TARTRATE 50 MG/1
50 TABLET, FILM COATED ORAL
Qty: 60 TABLET | Refills: 0 | Status: SHIPPED | OUTPATIENT
Start: 2020-12-02 | End: 2021-01-01

## 2020-12-02 RX ORDER — HYDROXYZINE HYDROCHLORIDE 25 MG/1
25 TABLET, FILM COATED ORAL NIGHTLY PRN
Refills: 0 | Status: SHIPPED | COMMUNITY
Start: 2020-12-02

## 2020-12-02 RX ORDER — METOPROLOL SUCCINATE 25 MG/1
50 TABLET, EXTENDED RELEASE ORAL DAILY
Refills: 0 | Status: SHIPPED | COMMUNITY
Start: 2020-12-02 | End: 2020-12-02

## 2020-12-02 RX ORDER — DILTIAZEM HYDROCHLORIDE 5 MG/ML
10 INJECTION INTRAVENOUS ONCE
Status: COMPLETED | OUTPATIENT
Start: 2020-12-02 | End: 2020-12-02

## 2020-12-02 RX ORDER — METOPROLOL TARTRATE 50 MG/1
50 TABLET, FILM COATED ORAL
Status: DISCONTINUED | OUTPATIENT
Start: 2020-12-02 | End: 2020-12-02

## 2020-12-02 RX ORDER — WARFARIN SODIUM 3 MG/1
TABLET ORAL
Qty: 20 TABLET | Refills: 0 | Status: SHIPPED | COMMUNITY
Start: 2020-12-02

## 2020-12-02 NOTE — PHYSICAL THERAPY NOTE
PHYSICAL THERAPY TREATMENT NOTE - INPATIENT     Room Number: 335/335-A       Presenting Problem: supratherpeutic INR(recent fall)    Problem List  Principal Problem:    Supratherapeutic INR  Active Problems:    Fall, initial encounter    Delirium due to an training;Strengthening    SUBJECTIVE  \"No\"    OBJECTIVE  Precautions: Bed/chair alarm    WEIGHT BEARING RESTRICTION  Weight Bearing Restriction: None                PAIN ASSESSMENT   Ratin  Location: LE pain with movement  Management Techniques:  Acti In progress max A x 2    Goal #2 Patient is able to demonstrate transfers Sit to/from Stand at assistance level: moderate assistance with walker - rolling      Goal #2  Current Status In progress, not tested    Goal #3 Patient is able to ambulate 10 feet w

## 2020-12-02 NOTE — TELEPHONE ENCOUNTER
Call received from [place] Pharmacist to clarify which ipatropium- albuterol and ipatropium- bromide nebulizer solutions were both ordered for the patient.  Pharmacist questioning which solution should be dispensed Discussed w Dr Tamica Peña whom confirmed o

## 2020-12-02 NOTE — DISCHARGE SUMMARY
[facility]    Discharge Summary    Shabnam Means Patient Status:  Inpatient    1930 MRN [id number]   Location [facility] 3W/SW Attending Duarte King MD   Hosp Day # 15 PCP Liss Lennon     Date of Admission: 6 Evidence of penetration but no obvious aspiration.     Supratherapeutic INR  -INR sub therapeutic  -resumed warfarin  -monitor INR - not therapeutic - suggest 6mg tonight and INR tomorrow morning with home health - results to PCP and instructions for famil wants to go home today. CULTURE:   No results found for this visit on 11/18/20. IMAGING STUDIES: SOME MAY NEED FOLLOW UP WITH PCP   Ct Brain Or Head (55266)    Result Date: 11/19/2020  CONCLUSION:  1.  Negative for depressed calvarial fracture, coup on 11/27/2020 at 8:24 AM          Xr Chest Ap Portable  (cpt=71045)    Result Date: 11/25/2020  CONCLUSION:  1.  Interval worsening in the chest with moderate left base consolidation suggesting worsening pneumonia and or atelectasis, and developing right ba 11/30/20  0817 12/01/20  0743   GLU 90 85 76   BUN 18 20* 21*   CREATSERUM 0.41* 0.50* 0.54*   GFRAA 105 99 96   GFRNAA 91 85 83   CA 8.6 8.8 9.1    143 143   K 4.2 4.5 4.3    100 100   CO2 42.0* 40.0* 40.0*     Lab Results   Component Value Da Itching.  Can cause drowsiness   Refills: 0     Warfarin Sodium 3 MG Tabs  Commonly known as: COUMADIN  What changed:   · how much to take  · how to take this  · when to take this  · additional instructions      Take 6 mg tonight (12/2/2020) and we have ord NEUROLOGY    Maylin Topete MD Consulting Physician  Psychiatry    Casey Julian, DO Consulting Physician  PULMONARY DISEASES    Nick Melo, DO Consulting Physician  HOSPITALIST                 Other Discharge Instructions:       Discharge In

## 2020-12-02 NOTE — OCCUPATIONAL THERAPY NOTE
OCCUPATIONAL THERAPY TREATMENT NOTE - INPATIENT        Room Number: 335/335-A           Presenting Problem: supratherapeutic INR; acute metabolic envephalopathy    Problem List  Principal Problem:    Supratherapeutic INR  Active Problems:    Fall, initial Inpatient Daily Activity Short Form  How much help from another person does the patient currently need…  -   Putting on and taking off regular lower body clothing?: A Lot  -   Bathing (including washing, rinsing, drying)?: A Lot  -   Toileting, which inclu

## 2020-12-02 NOTE — CM/SW NOTE
12/02/20 1200   Discharge disposition   Expected discharge disposition Home-Health   Name of [address] Alan Sexton Provider   ([address])   Discharge transportation [facility]   GABE kirkpatrick

## 2020-12-02 NOTE — DISCHARGE PLANNING
Patient is drowsy and oriented x 2, denies CP, denies SOB. To be discharged home today. Went over discharge instructions and importance of follow up appointments.  Explained any new and/or existing medications, their use, and side effects; specifically with

## 2020-12-02 NOTE — PROGRESS NOTES
[facility] - [facility]     Progress Note        Ram Morning Patient Status:  Inpatient    1930 MRN [id number]   Location [facility] 3W/SW Attending Maya Zacarias, [address] Day # 15 PCP Robb Bah Patience       Subjective:   Patient suspension 30 mL, 30 mL, Oral, Daily PRN    •  bisacodyl (DULCOLAX) rectal suppository 10 mg, 10 mg, Rectal, Daily PRN    •  Fleet Enema (FLEET) 7-19 GM/118ML enema 133 mL, 1 enema, Rectal, Once PRN    •  ondansetron HCl (ZOFRAN) injection 4 mg, 4 mg, Intr

## 2020-12-02 NOTE — PLAN OF CARE
Patient is drowsy and oriented x2-3. 3L O2 nasal cannula. Complete care, max assist. Nectar thick and puree diet. Scott in place. Incontinent of bowel. IV antibiotics. Plan is to go home today. Will continue to monitor.     Problem: Patient Centered Care  G Encourage broncho-pulmonary hygiene including cough, deep breathe, Incentive Spirometry  - Assess the need for suctioning and perform as needed  - Assess and instruct to report SOB or any respiratory difficulty  - Respiratory Therapy support as indicated w/o assistive devices  - Assist with transfers and ambulation using safe patient handling equipment as needed  - Ensure adequate protection for wounds/incisions during mobilization  - Obtain PT/OT consults as needed  - Advance activity as appropriate  - Co rounding maintained   - Bed alarm on   - PT consult  - No skid socks in place  - Frequent reorientation   Outcome: Adequate for Discharge     Problem: Delirium  Goal: Minimize duration of delirium  Description: Interventions:  - Encourage use of hearing ai

## 2020-12-02 NOTE — CM/SW NOTE
Will need an updated Home Oxygen evaluation and signed Oxygen order.    HOME OXYGEN EVALUATION  From Nursing Note:     OXYGEN SATURATION ON ROOM AIR AT REST:   XX%  OXYGEN SATURATION ON  XX liters   AT REST:   XX%  OXYGEN SATURATION  WALKING ON ROOM AIR  X

## 2020-12-02 NOTE — PLAN OF CARE
Pt AOX2; drowsy. Bipap on from [phone]. Now on 3L NC. Pulse sustained 110s-130s; IV cardizem given X1. IV Zosyn cont'd. Scott in place. Denied pain this shift. Plan to d/c today with palliative HH. Bed locked and in lowest position at all times.  Call olga if applicable  - Encourage broncho-pulmonary hygiene including cough, deep breathe, Incentive Spirometry  - Assess the need for suctioning and perform as needed  - Assess and instruct to report SOB or any respiratory difficulty  - Respiratory Therapy suppo Assist with transfers and ambulation using safe patient handling equipment as needed  - Ensure adequate protection for wounds/incisions during mobilization  - Obtain PT/OT consults as needed  - Advance activity as appropriate  - Communicate ordered activit Outcome: Progressing     Problem: Delirium  Goal: Minimize duration of delirium  Description: Interventions:  - Encourage use of hearing aids, eye glasses  - Promote highest level of mobility daily  - Provide frequent reorientation  - Promote wakefulness

## 2020-12-02 NOTE — PROGRESS NOTES
Oxygen saturation resting on RA- 90%  Oxygen saturation with exertion on RA- 84%  Oxygen saturation resting on 3L- 100%  Oxygen saturation with exertion on 3L- 98%

## (undated) NOTE — ED AVS SNAPSHOT
Essence Taylor   MRN: [id number]    Department:  [facility] Emergency Department   Date of Visit:  8/25/2019           Disclosure     Insurance plans vary and the physician(s) referred by the ER may not be covered by your plan.  Please contac within the next three months to obtain basic health screening including reassessment of your blood pressure.     IF THERE IS ANY CHANGE OR WORSENING OF YOUR CONDITION, CALL YOUR PRIMARY CARE PHYSICIAN AT ONCE OR RETURN IMMEDIATELY TO THE EMERGENCY DEPARTMEN

## (undated) NOTE — ED AVS SNAPSHOT
Gilmer Carlos   MRN: [id number]    Department:  [facility] Emergency Department   Date of Visit:  12/2/2019           Disclosure     Insurance plans vary and the physician(s) referred by the ER may not be covered by your plan.  Please contac within the next three months to obtain basic health screening including reassessment of your blood pressure.     IF THERE IS ANY CHANGE OR WORSENING OF YOUR CONDITION, CALL YOUR PRIMARY CARE PHYSICIAN AT ONCE OR RETURN IMMEDIATELY TO THE EMERGENCY DEPARTMEN

## (undated) NOTE — ED AVS SNAPSHOT
Jeremias Hickman   MRN: [id number]    Department:  [facility] Emergency Department   Date of Visit:  1/17/2020           Disclosure     Insurance plans vary and the physician(s) referred by the ER may not be covered by your plan.  Please contac within the next three months to obtain basic health screening including reassessment of your blood pressure.     IF THERE IS ANY CHANGE OR WORSENING OF YOUR CONDITION, CALL YOUR PRIMARY CARE PHYSICIAN AT ONCE OR RETURN IMMEDIATELY TO THE EMERGENCY DEPARTMEN

## (undated) NOTE — IP AVS SNAPSHOT
[facility]            (For Outpatient Use Only) Initial Admit Date: 11/18/2020   Inpt/Obs Admit Date: Inpt: 11/18/20 / Obs: N/A   Discharge Date:    Simone Early:  [de-identified]   MRN: [de-identified]   CSN: [id number]   CEID: [id number]        E [employer] INSURANCE   Payor:  Plan:    Group Number:  Insurance Type:    Subscriber Name:  Subscriber :    Subscriber ID:  Pt Rel to Subscriber:    Hospital Account Financial Class: Medicare    2020

## (undated) NOTE — ED AVS SNAPSHOT
Mykel Wyman   MRN: [id number]    Department:  [facility] Emergency Department   Date of Visit:  12/22/2018           Disclosure     Insurance plans vary and the physician(s) referred by the ER may not be covered by your plan.  Please conta within the next three months to obtain basic health screening including reassessment of your blood pressure.     IF THERE IS ANY CHANGE OR WORSENING OF YOUR CONDITION, CALL YOUR PRIMARY CARE PHYSICIAN AT ONCE OR RETURN IMMEDIATELY TO THE EMERGENCY DEPARTMEN